# Patient Record
Sex: FEMALE | Race: WHITE | Employment: FULL TIME | URBAN - METROPOLITAN AREA
[De-identification: names, ages, dates, MRNs, and addresses within clinical notes are randomized per-mention and may not be internally consistent; named-entity substitution may affect disease eponyms.]

---

## 2017-04-14 DIAGNOSIS — F41.1 GENERALIZED ANXIETY DISORDER: ICD-10-CM

## 2017-04-17 RX ORDER — CITALOPRAM 10 MG/1
10 TABLET ORAL DAILY
Qty: 30 TABLET | Refills: 0 | Status: SHIPPED | OUTPATIENT
Start: 2017-04-17 | End: 2017-04-26 | Stop reason: SDUPTHER

## 2017-04-26 ENCOUNTER — OFFICE VISIT (OUTPATIENT)
Dept: FAMILY MEDICINE CLINIC | Age: 37
End: 2017-04-26
Payer: COMMERCIAL

## 2017-04-26 VITALS
DIASTOLIC BLOOD PRESSURE: 76 MMHG | RESPIRATION RATE: 18 BRPM | TEMPERATURE: 98.6 F | HEIGHT: 63 IN | HEART RATE: 72 BPM | SYSTOLIC BLOOD PRESSURE: 120 MMHG | WEIGHT: 156 LBS | BODY MASS INDEX: 27.64 KG/M2

## 2017-04-26 DIAGNOSIS — M79.641 RIGHT HAND PAIN: ICD-10-CM

## 2017-04-26 DIAGNOSIS — G56.01 CARPAL TUNNEL SYNDROME ON RIGHT: ICD-10-CM

## 2017-04-26 DIAGNOSIS — Z11.4 SCREENING FOR HIV (HUMAN IMMUNODEFICIENCY VIRUS): ICD-10-CM

## 2017-04-26 DIAGNOSIS — F41.1 GENERALIZED ANXIETY DISORDER: Primary | ICD-10-CM

## 2017-04-26 DIAGNOSIS — M79.644 THUMB PAIN, RIGHT: ICD-10-CM

## 2017-04-26 DIAGNOSIS — M65.4 DE QUERVAIN'S TENOSYNOVITIS, RIGHT: ICD-10-CM

## 2017-04-26 PROBLEM — E10.3299 MILD NONPROLIFERATIVE DIABETIC RETINOPATHY WITHOUT MACULAR EDEMA ASSOCIATED WITH TYPE 1 DIABETES MELLITUS (HCC): Status: ACTIVE | Noted: 2017-04-26

## 2017-04-26 PROCEDURE — 99214 OFFICE O/P EST MOD 30 MIN: CPT | Performed by: PEDIATRICS

## 2017-04-26 RX ORDER — CITALOPRAM 10 MG/1
10 TABLET ORAL DAILY
Qty: 30 TABLET | Refills: 5 | Status: SHIPPED | OUTPATIENT
Start: 2017-04-26 | End: 2017-11-17 | Stop reason: SDUPTHER

## 2017-04-26 ASSESSMENT — ENCOUNTER SYMPTOMS
STRIDOR: 0
VOMITING: 0
COUGH: 0
EYE DISCHARGE: 0
DIARRHEA: 0
SHORTNESS OF BREATH: 0
CONSTIPATION: 0
COLOR CHANGE: 0
WHEEZING: 0
EYE PAIN: 0
EYE REDNESS: 0

## 2017-05-10 LAB
ALBUMIN SERPL-MCNC: 4.2 G/DL
ALP BLD-CCNC: 77 U/L
ALT SERPL-CCNC: 21 U/L
ANION GAP SERPL CALCULATED.3IONS-SCNC: 11 MMOL/L
AST SERPL-CCNC: 22 U/L
BASOPHILS ABSOLUTE: NORMAL /ΜL
BASOPHILS RELATIVE PERCENT: NORMAL %
BILIRUB SERPL-MCNC: 0.6 MG/DL (ref 0.1–1.4)
BUN BLDV-MCNC: 18 MG/DL
CALCIUM SERPL-MCNC: 9.7 MG/DL
CHLORIDE BLD-SCNC: 102 MMOL/L
CHOLESTEROL, TOTAL: 204 MG/DL
CHOLESTEROL/HDL RATIO: 2
CO2: 27 MMOL/L
CREAT SERPL-MCNC: 0.7 MG/DL
CREATININE, URINE: 35
EOSINOPHILS ABSOLUTE: NORMAL /ΜL
EOSINOPHILS RELATIVE PERCENT: NORMAL %
GFR CALCULATED: 100.1
GLUCOSE BLD-MCNC: 145 MG/DL
HBA1C MFR BLD: 8.7 %
HCT VFR BLD CALC: 39.5 % (ref 36–46)
HDLC SERPL-MCNC: 101 MG/DL (ref 35–70)
HEMOGLOBIN: 13.7 G/DL (ref 12–16)
LDL CHOLESTEROL CALCULATED: 82.4 MG/DL (ref 0–160)
LYMPHOCYTES ABSOLUTE: NORMAL /ΜL
LYMPHOCYTES RELATIVE PERCENT: NORMAL %
MCH RBC QN AUTO: 30.4 PG
MCHC RBC AUTO-ENTMCNC: 34.7 G/DL
MCV RBC AUTO: 88 FL
MICROALBUMIN/CREAT 24H UR: 6.4 MG/G{CREAT}
MICROALBUMIN/CREAT UR-RTO: 18.3
MONOCYTES ABSOLUTE: NORMAL /ΜL
MONOCYTES RELATIVE PERCENT: NORMAL %
NEUTROPHILS ABSOLUTE: NORMAL /ΜL
NEUTROPHILS RELATIVE PERCENT: NORMAL %
PLATELET # BLD: 265 K/ΜL
PMV BLD AUTO: 8.8 FL
POTASSIUM SERPL-SCNC: 3.9 MMOL/L
RBC # BLD: 4.51 10^6/ΜL
SODIUM BLD-SCNC: 140 MMOL/L
TOTAL PROTEIN: 7.3
TRIGL SERPL-MCNC: 103 MG/DL
TSH SERPL DL<=0.05 MIU/L-ACNC: 1.16 UIU/ML
VLDLC SERPL CALC-MCNC: 20.6 MG/DL
WBC # BLD: 7.1 10^3/ML

## 2017-09-11 LAB
AVERAGE GLUCOSE: NORMAL
HBA1C MFR BLD: 8.3 %

## 2017-09-29 ENCOUNTER — TELEPHONE (OUTPATIENT)
Dept: FAMILY MEDICINE CLINIC | Age: 37
End: 2017-09-29

## 2017-10-05 RX ORDER — ETONOGESTREL/ETHINYL ESTRADIOL .12-.015MG
RING, VAGINAL VAGINAL
Qty: 1 EACH | Refills: 11 | Status: SHIPPED | OUTPATIENT
Start: 2017-10-05 | End: 2018-01-10 | Stop reason: SDUPTHER

## 2017-11-17 ENCOUNTER — OFFICE VISIT (OUTPATIENT)
Dept: FAMILY MEDICINE CLINIC | Age: 37
End: 2017-11-17
Payer: COMMERCIAL

## 2017-11-17 VITALS
WEIGHT: 159 LBS | RESPIRATION RATE: 20 BRPM | DIASTOLIC BLOOD PRESSURE: 70 MMHG | TEMPERATURE: 98.6 F | HEART RATE: 78 BPM | SYSTOLIC BLOOD PRESSURE: 120 MMHG | BODY MASS INDEX: 28.17 KG/M2

## 2017-11-17 DIAGNOSIS — G56.01 CARPAL TUNNEL SYNDROME ON RIGHT: ICD-10-CM

## 2017-11-17 DIAGNOSIS — M79.644 THUMB PAIN, RIGHT: ICD-10-CM

## 2017-11-17 DIAGNOSIS — I83.93 SPIDER VEINS OF BOTH LOWER EXTREMITIES: ICD-10-CM

## 2017-11-17 DIAGNOSIS — F41.1 GENERALIZED ANXIETY DISORDER: Primary | ICD-10-CM

## 2017-11-17 DIAGNOSIS — M65.4 DE QUERVAIN'S TENOSYNOVITIS, RIGHT: ICD-10-CM

## 2017-11-17 DIAGNOSIS — I83.93 VARICOSE VEINS OF BOTH LOWER EXTREMITIES: ICD-10-CM

## 2017-11-17 DIAGNOSIS — M79.641 RIGHT HAND PAIN: ICD-10-CM

## 2017-11-17 PROCEDURE — 99214 OFFICE O/P EST MOD 30 MIN: CPT | Performed by: PEDIATRICS

## 2017-11-17 RX ORDER — CITALOPRAM 10 MG/1
10 TABLET ORAL DAILY
Qty: 30 TABLET | Refills: 5 | Status: SHIPPED | OUTPATIENT
Start: 2017-11-17 | End: 2018-07-02 | Stop reason: SDUPTHER

## 2017-11-17 ASSESSMENT — ENCOUNTER SYMPTOMS
CONSTIPATION: 0
EYE PAIN: 0
EYE REDNESS: 0
STRIDOR: 0
COLOR CHANGE: 0
EYE DISCHARGE: 0
WHEEZING: 0
SHORTNESS OF BREATH: 0
VOMITING: 0
COUGH: 0
DIARRHEA: 0

## 2017-11-17 ASSESSMENT — PATIENT HEALTH QUESTIONNAIRE - PHQ9
SUM OF ALL RESPONSES TO PHQ QUESTIONS 1-9: 0
2. FEELING DOWN, DEPRESSED OR HOPELESS: 0
SUM OF ALL RESPONSES TO PHQ9 QUESTIONS 1 & 2: 0
1. LITTLE INTEREST OR PLEASURE IN DOING THINGS: 0

## 2017-11-17 NOTE — PROGRESS NOTES
Negative for agitation, dysphoric mood and sleep disturbance. The patient is nervous/anxious (controlled with celexa). Objective:   Physical Exam   Constitutional: She is oriented to person, place, and time. She appears well-developed and well-nourished. No distress. Eyes: Conjunctivae are normal. Pupils are equal, round, and reactive to light. Right eye exhibits no discharge. Left eye exhibits no discharge. Neck: Normal range of motion. Neck supple. No JVD present. Cardiovascular: Normal rate, regular rhythm and normal heart sounds. No murmur heard. Varicose veins on the left lower leg. Some spider veins of the right lower leg. Pulmonary/Chest: Effort normal and breath sounds normal. She has no wheezes. She has no rales. Abdominal: Soft. Bowel sounds are normal. She exhibits no mass. There is no tenderness. Musculoskeletal: She exhibits no edema or deformity. Right wrist: She exhibits tenderness (over the carpal tunnel). She exhibits normal range of motion and no deformity. Arms:  Lymphadenopathy:     She has no cervical adenopathy. Neurological: She is alert and oriented to person, place, and time. Skin: Skin is warm. No rash noted. She is not diaphoretic. Psychiatric: Her speech is normal and behavior is normal. Judgment and thought content normal. Her mood appears not anxious. Cognition and memory are normal. She does not exhibit a depressed mood. Nursing note and vitals reviewed. Assessment:      1. Generalized anxiety disorder  citalopram (CELEXA) 10 MG tablet   2. Right hand pain  External Referral To Massage Therapy   3. Carpal tunnel syndrome on right  External Referral To Massage Therapy   4. Thumb pain, right  SPLINT THUMB SPICA    External Referral To Massage Therapy   5. De Quervain's tenosynovitis, right  SPLINT THUMB SPICA    External Referral To Massage Therapy   6.  Spider veins of both lower extremities  [facility]- Becky Williamson MD   7. Varicose veins of both lower extremities  [facility]- Alfa Singh MD           Plan:      Proceed with continue same medications   Recommend daily exercise / healthy diet  Advise good sleep hygiene   Stress relieving activities encouraged  Recommend trial of carpal tunnel brace and thumb spica splint splint for right hand pain likely secondary to CTS and possible DeQuervain's tendonitis  Obtain lab as ordered when due for diabetes labs with endocrinologist  Vascular consult for evaluation of spider and varicose veins  Call with concerns     Sky received counseling on the following healthy behaviors: nutrition, exercise and medication adherence  Reviewed prior labs and health maintenance  Continue current medications, diet and exercise. Discussed use, benefit, and side effects of prescribed medications. Barriers to medication compliance addressed. Patient given educational materials - see patient instructions  Was a self-tracking handout given in paper form or via [employer]? No    Requested Prescriptions     Signed Prescriptions Disp Refills    citalopram (CELEXA) 10 MG tablet 30 tablet 5     Sig: Take 1 tablet by mouth daily       All patient questions answered. Patient voiced understanding. Quality Measures    Body mass index is 28.17 kg/m². Elevated. Weight control planned discussed Healthy diet and regular exercise. BP: 120/70 Blood pressure is normal. Treatment plan consists of No treatment change needed.     Lab Results   Component Value Date    LDLCALC 82.4 05/10/2017    (goal LDL reduction with dx if diabetes is 50% LDL reduction)      PHQ Scores 11/17/2017   PHQ2 Score 0   PHQ9 Score 0     Interpretation of Total Score Depression Severity: 1-4 = Minimal depression, 5-9 = Mild depression, 10-14 = Moderate depression, 15-19 = Moderately severe depression, 20-27 = Severe depression

## 2018-01-10 ENCOUNTER — OFFICE VISIT (OUTPATIENT)
Dept: OBGYN CLINIC | Age: 38
End: 2018-01-10
Payer: COMMERCIAL

## 2018-01-10 VITALS
SYSTOLIC BLOOD PRESSURE: 140 MMHG | WEIGHT: 161.4 LBS | BODY MASS INDEX: 27.55 KG/M2 | DIASTOLIC BLOOD PRESSURE: 74 MMHG | HEIGHT: 64 IN

## 2018-01-10 DIAGNOSIS — Z30.44 ENCOUNTER FOR SURVEILLANCE OF VAGINAL RING HORMONAL CONTRACEPTIVE DEVICE: ICD-10-CM

## 2018-01-10 DIAGNOSIS — Z01.419 WELL FEMALE EXAM WITH ROUTINE GYNECOLOGICAL EXAM: Primary | ICD-10-CM

## 2018-01-10 PROCEDURE — 99395 PREV VISIT EST AGE 18-39: CPT | Performed by: OBSTETRICS & GYNECOLOGY

## 2018-01-10 RX ORDER — ETONOGESTREL AND ETHINYL ESTRADIOL 11.7; 2.7 MG/1; MG/1
1 INSERT, EXTENDED RELEASE VAGINAL SEE ADMIN INSTRUCTIONS
Qty: 3 EACH | Refills: 3 | Status: SHIPPED | OUTPATIENT
Start: 2018-01-10 | End: 2019-01-01 | Stop reason: SDUPTHER

## 2018-01-10 ASSESSMENT — ENCOUNTER SYMPTOMS
BLOOD IN STOOL: 0
WHEEZING: 0
DIARRHEA: 0
APNEA: 0
COUGH: 0
ABDOMINAL DISTENTION: 0
NAUSEA: 0
SORE THROAT: 0
ANAL BLEEDING: 0
SHORTNESS OF BREATH: 0
VOMITING: 0
ABDOMINAL PAIN: 0
CONSTIPATION: 0
RECTAL PAIN: 0
BACK PAIN: 0
TROUBLE SWALLOWING: 0
CHEST TIGHTNESS: 0

## 2018-01-10 NOTE — PROGRESS NOTES
decreased concentration, dysphoric mood, sleep disturbance and suicidal ideas. The patient is not nervous/anxious. Physical Exam   Constitutional: She is oriented to person, place, and time. She appears well-developed and well-nourished. No distress. HENT:   Head: Normocephalic and atraumatic. Mouth/Throat: Oropharynx is clear and moist.   Eyes: Pupils are equal, round, and reactive to light. Neck: Normal range of motion. Neck supple. No thyromegaly present. Cardiovascular: Normal rate, regular rhythm and normal heart sounds. No murmur heard. Pulmonary/Chest: Effort normal and breath sounds normal. She has no wheezes. She has no rales. She exhibits no tenderness. Abdominal: Soft. Bowel sounds are normal. She exhibits no distension and no mass. There is no tenderness. There is no rebound and no guarding. Genitourinary: Vagina normal and uterus normal. Rectal exam shows no external hemorrhoid and anal tone normal. No breast swelling, tenderness, discharge or bleeding. There is no lesion on the right labia. There is no lesion on the left labia. Uterus is not deviated, not enlarged, not fixed and not tender. Cervix exhibits no motion tenderness, no discharge and no friability. Right adnexum displays no mass, no tenderness and no fullness. Left adnexum displays no mass, no tenderness and no fullness. No erythema in the vagina. No vaginal discharge found. Musculoskeletal: Normal range of motion. She exhibits no edema or tenderness. Lymphadenopathy:     She has no cervical adenopathy. Right: No inguinal adenopathy present. Left: No inguinal adenopathy present. Neurological: She is alert and oriented to person, place, and time. She has normal reflexes. No cranial nerve deficit. Coordination normal.   Skin: Skin is warm and dry. No rash noted. She is not diaphoretic. No erythema. No pallor. Psychiatric: She has a normal mood and affect.  Her behavior is normal. Judgment and

## 2018-01-22 DIAGNOSIS — Z01.419 WELL FEMALE EXAM WITH ROUTINE GYNECOLOGICAL EXAM: ICD-10-CM

## 2018-01-22 LAB — PAP SMEAR: NORMAL

## 2018-01-22 NOTE — LETTER
[address] [place] [address] [address]  [place] [facility]  Phone: [phone]  Fax: [phone]    Dear Haleigh Gaona,    The results of the following test(s) you had done  are as follows and requires follow up as indicated.         Within Normal Limits   Further Action     Annual Follow Up      As Directed         Pap Smear:                      Mammogram:                          Endometrial Biopsy:                                                              Cervical Biopsy:                                                                     Dexascan:                                                                                                Other:            Instructions for further action:         Even if findings are within normal limits now, it is important to continue annual visits with your doctor for regular screenings and exam.    Thank You,     Dr. Jose Bradshaw

## 2018-06-14 ENCOUNTER — OFFICE VISIT (OUTPATIENT)
Dept: FAMILY MEDICINE CLINIC | Age: 38
End: 2018-06-14
Payer: COMMERCIAL

## 2018-06-14 VITALS
DIASTOLIC BLOOD PRESSURE: 82 MMHG | SYSTOLIC BLOOD PRESSURE: 130 MMHG | TEMPERATURE: 99.1 F | RESPIRATION RATE: 18 BRPM | BODY MASS INDEX: 28.48 KG/M2 | WEIGHT: 164.6 LBS | HEART RATE: 90 BPM

## 2018-06-14 DIAGNOSIS — I83.93 SPIDER VEINS OF BOTH LOWER EXTREMITIES: ICD-10-CM

## 2018-06-14 DIAGNOSIS — F41.1 GENERALIZED ANXIETY DISORDER: Primary | ICD-10-CM

## 2018-06-14 DIAGNOSIS — I83.93 VARICOSE VEINS OF BOTH LOWER EXTREMITIES: ICD-10-CM

## 2018-06-14 PROCEDURE — 99214 OFFICE O/P EST MOD 30 MIN: CPT | Performed by: PEDIATRICS

## 2018-06-14 RX ORDER — ALPRAZOLAM 0.5 MG/1
0.25 TABLET ORAL DAILY PRN
Qty: 15 TABLET | Refills: 0 | Status: SHIPPED | OUTPATIENT
Start: 2018-06-14 | End: 2018-12-14 | Stop reason: SDUPTHER

## 2018-06-14 ASSESSMENT — ENCOUNTER SYMPTOMS
VOMITING: 0
STRIDOR: 0
EYE DISCHARGE: 0
EYE PAIN: 0
COUGH: 0
WHEEZING: 0
COLOR CHANGE: 0
EYE REDNESS: 0
SHORTNESS OF BREATH: 0
DIARRHEA: 0
CONSTIPATION: 0

## 2018-06-25 LAB
ALBUMIN SERPL-MCNC: 4.2 G/DL
ALP BLD-CCNC: 88 U/L
ALT SERPL-CCNC: 28 U/L
ANION GAP SERPL CALCULATED.3IONS-SCNC: 9 MMOL/L
AST SERPL-CCNC: 22 U/L
BASOPHILS ABSOLUTE: NORMAL /ΜL
BASOPHILS RELATIVE PERCENT: NORMAL %
BILIRUB SERPL-MCNC: 0.5 MG/DL (ref 0.1–1.4)
BUN BLDV-MCNC: 23 MG/DL
CALCIUM SERPL-MCNC: 10.1 MG/DL
CHLORIDE BLD-SCNC: 99 MMOL/L
CO2: 30 MMOL/L
CREAT SERPL-MCNC: 0.8 MG/DL
CREATININE URINE: 96.7 MG/DL
EOSINOPHILS ABSOLUTE: NORMAL /ΜL
EOSINOPHILS RELATIVE PERCENT: NORMAL %
GFR CALCULATED: 111.7
GLUCOSE BLD-MCNC: 164 MG/DL
HCT VFR BLD CALC: 41.2 % (ref 36–46)
HEMOGLOBIN: 14.2 G/DL (ref 12–16)
LYMPHOCYTES ABSOLUTE: NORMAL /ΜL
LYMPHOCYTES RELATIVE PERCENT: NORMAL %
MCH RBC QN AUTO: 34.5 PG
MCHC RBC AUTO-ENTMCNC: 89 G/DL
MCV RBC AUTO: 30.8 FL
MICROALBUMIN/CREAT 24H UR: 14.3 MG/G{CREAT}
MONOCYTES ABSOLUTE: NORMAL /ΜL
MONOCYTES RELATIVE PERCENT: NORMAL %
NEUTROPHILS ABSOLUTE: NORMAL /ΜL
NEUTROPHILS RELATIVE PERCENT: NORMAL %
PLATELET # BLD: 257 K/ΜL
PMV BLD AUTO: 9.5 FL
POTASSIUM SERPL-SCNC: 3.9 MMOL/L
RBC # BLD: 4.62 10^6/ΜL
SODIUM BLD-SCNC: 138 MMOL/L
TOTAL PROTEIN: 7.4
TSH SERPL DL<=0.05 MIU/L-ACNC: 1.31 UIU/ML
WBC # BLD: 7.8 10^3/ML

## 2018-07-02 DIAGNOSIS — F41.1 GENERALIZED ANXIETY DISORDER: ICD-10-CM

## 2018-07-03 RX ORDER — CITALOPRAM 10 MG/1
10 TABLET ORAL DAILY
Qty: 30 TABLET | Refills: 5 | Status: SHIPPED | OUTPATIENT
Start: 2018-07-03 | End: 2018-12-14 | Stop reason: SDUPTHER

## 2018-12-14 ENCOUNTER — OFFICE VISIT (OUTPATIENT)
Dept: FAMILY MEDICINE CLINIC | Age: 38
End: 2018-12-14
Payer: COMMERCIAL

## 2018-12-14 VITALS
SYSTOLIC BLOOD PRESSURE: 120 MMHG | DIASTOLIC BLOOD PRESSURE: 70 MMHG | RESPIRATION RATE: 18 BRPM | BODY MASS INDEX: 28.37 KG/M2 | WEIGHT: 164 LBS | TEMPERATURE: 98.8 F | HEART RATE: 72 BPM

## 2018-12-14 DIAGNOSIS — I83.813 VARICOSE VEINS OF BOTH LOWER EXTREMITIES WITH PAIN: ICD-10-CM

## 2018-12-14 DIAGNOSIS — F41.1 GENERALIZED ANXIETY DISORDER: Primary | ICD-10-CM

## 2018-12-14 DIAGNOSIS — I83.93 SPIDER VEINS OF BOTH LOWER EXTREMITIES: ICD-10-CM

## 2018-12-14 PROCEDURE — 99214 OFFICE O/P EST MOD 30 MIN: CPT | Performed by: PEDIATRICS

## 2018-12-14 RX ORDER — ALPRAZOLAM 0.5 MG/1
0.25 TABLET ORAL DAILY PRN
Qty: 15 TABLET | Refills: 0 | Status: SHIPPED | OUTPATIENT
Start: 2018-12-14 | End: 2019-07-31 | Stop reason: SDUPTHER

## 2018-12-14 RX ORDER — CITALOPRAM 10 MG/1
10 TABLET ORAL DAILY
Qty: 30 TABLET | Refills: 5 | Status: SHIPPED | OUTPATIENT
Start: 2018-12-14 | End: 2019-07-05 | Stop reason: SDUPTHER

## 2018-12-14 ASSESSMENT — PATIENT HEALTH QUESTIONNAIRE - PHQ9
2. FEELING DOWN, DEPRESSED OR HOPELESS: 0
1. LITTLE INTEREST OR PLEASURE IN DOING THINGS: 0
SUM OF ALL RESPONSES TO PHQ QUESTIONS 1-9: 0
SUM OF ALL RESPONSES TO PHQ QUESTIONS 1-9: 0
SUM OF ALL RESPONSES TO PHQ9 QUESTIONS 1 & 2: 0

## 2018-12-14 ASSESSMENT — ENCOUNTER SYMPTOMS
STRIDOR: 0
VOMITING: 0
SHORTNESS OF BREATH: 0
CONSTIPATION: 0
DIARRHEA: 0
EYE REDNESS: 0
COLOR CHANGE: 0
EYE PAIN: 0
COUGH: 0
EYE DISCHARGE: 0
WHEEZING: 0

## 2018-12-14 NOTE — PROGRESS NOTES
FLEXTOUCH) 100 UNIT/ML SOPN Inject 25 Units into the skin      citalopram (CELEXA) 10 MG tablet Take 1 tablet by mouth daily 30 tablet 5    ALPRAZolam (XANAX) 0.5 MG tablet Take 0.5 tablets by mouth daily as needed for Sleep or Anxiety for up to 30 days. . 15 tablet 0    etonogestrel-ethinyl estradiol (NUVARING) 0.12-0.015 MG/24HR vaginal ring Place 1 each vaginally See Admin Instructions 3 each 3    Insulin Aspart Prot & Aspart (NOVOLOG MIX 70/30 PENFILL SC) Inject into the skin Sliding scale      Multiple Vitamins-Minerals (THERAPEUTIC MULTIVITAMIN-MINERALS) tablet Take 1 tablet by mouth daily      ONE TOUCH ULTRA TEST strip       B-D INS SYRINGE 0.5CC/30GX1/2\" 30G X 1/2\" 0.5 ML MISC        No current facility-administered medications for this visit. Anxiety: Patient complains of evaluation of anxiety disorder. She has the following anxiety symptoms: none. Onset of symptoms was approximately several years ago, stable since that time. She denies current suicidal and homicidal ideation. Family history significant for no psychiatric illness. Possible organic causes contributing are: none. Risk factors: none Previous treatment includes Celexa and none. She complains of the following side effects from the treatment: none. HPI    Patient presents today for routine follow-up of generalized anxiety disorder. She states that overall her anxiety is very well controlled with Celexa 10 mg once daily. She feels less stress, less irritable and does not feel depressed or anxious. She denies any side effects from her medication. She does have a prescription for Xanax for doctors appointments because she does become very nervous when going to the doctor. She does have diabetes type 1 which is managed by endocrinology. She also has painful spider veins and varicose veins of the lower legs. She was referred to vascular surgery after her last visit however she has not made this appointment.   She will do this

## 2019-02-01 RX ORDER — ETONOGESTREL AND ETHINYL ESTRADIOL 11.7; 2.7 MG/1; MG/1
1 INSERT, EXTENDED RELEASE VAGINAL SEE ADMIN INSTRUCTIONS
Qty: 3 EACH | Refills: 0 | Status: SHIPPED | OUTPATIENT
Start: 2019-02-01 | End: 2019-02-13 | Stop reason: SDUPTHER

## 2019-02-13 ENCOUNTER — OFFICE VISIT (OUTPATIENT)
Dept: OBGYN CLINIC | Age: 39
End: 2019-02-13
Payer: COMMERCIAL

## 2019-02-13 VITALS
WEIGHT: 164 LBS | BODY MASS INDEX: 28 KG/M2 | HEIGHT: 64 IN | SYSTOLIC BLOOD PRESSURE: 138 MMHG | DIASTOLIC BLOOD PRESSURE: 88 MMHG

## 2019-02-13 DIAGNOSIS — Z12.39 SCREENING FOR BREAST CANCER: ICD-10-CM

## 2019-02-13 DIAGNOSIS — Z01.419 VISIT FOR GYNECOLOGIC EXAMINATION: Primary | ICD-10-CM

## 2019-02-13 PROCEDURE — 99395 PREV VISIT EST AGE 18-39: CPT | Performed by: OBSTETRICS & GYNECOLOGY

## 2019-02-13 RX ORDER — ETONOGESTREL AND ETHINYL ESTRADIOL 11.7; 2.7 MG/1; MG/1
1 INSERT, EXTENDED RELEASE VAGINAL SEE ADMIN INSTRUCTIONS
Qty: 3 EACH | Refills: 0 | Status: SHIPPED | OUTPATIENT
Start: 2019-02-13 | End: 2020-02-18

## 2019-02-13 ASSESSMENT — ENCOUNTER SYMPTOMS
VOMITING: 0
NAUSEA: 0
RECTAL PAIN: 0
CONSTIPATION: 0
ABDOMINAL DISTENTION: 0
BACK PAIN: 0
ABDOMINAL PAIN: 0
BLOOD IN STOOL: 0
ANAL BLEEDING: 0
WHEEZING: 0
SORE THROAT: 0
COUGH: 0
SHORTNESS OF BREATH: 0
DIARRHEA: 0
APNEA: 0
TROUBLE SWALLOWING: 0
CHEST TIGHTNESS: 0

## 2019-02-20 DIAGNOSIS — Z01.419 VISIT FOR GYNECOLOGIC EXAMINATION: ICD-10-CM

## 2019-02-20 LAB — PAP SMEAR: NORMAL

## 2019-07-05 DIAGNOSIS — F41.1 GENERALIZED ANXIETY DISORDER: ICD-10-CM

## 2019-07-09 RX ORDER — CITALOPRAM 10 MG/1
TABLET ORAL
Qty: 30 TABLET | Refills: 5 | Status: SHIPPED | OUTPATIENT
Start: 2019-07-09 | End: 2020-01-13

## 2019-07-31 ENCOUNTER — OFFICE VISIT (OUTPATIENT)
Dept: FAMILY MEDICINE CLINIC | Age: 39
End: 2019-07-31
Payer: COMMERCIAL

## 2019-07-31 VITALS
RESPIRATION RATE: 20 BRPM | BODY MASS INDEX: 28.2 KG/M2 | WEIGHT: 163 LBS | DIASTOLIC BLOOD PRESSURE: 80 MMHG | TEMPERATURE: 98.3 F | HEART RATE: 80 BPM | SYSTOLIC BLOOD PRESSURE: 122 MMHG

## 2019-07-31 DIAGNOSIS — F41.1 GENERALIZED ANXIETY DISORDER: Primary | ICD-10-CM

## 2019-07-31 DIAGNOSIS — I83.813 VARICOSE VEINS OF BOTH LOWER EXTREMITIES WITH PAIN: ICD-10-CM

## 2019-07-31 DIAGNOSIS — I83.93 SPIDER VEINS OF BOTH LOWER EXTREMITIES: ICD-10-CM

## 2019-07-31 PROCEDURE — 99214 OFFICE O/P EST MOD 30 MIN: CPT | Performed by: PEDIATRICS

## 2019-07-31 RX ORDER — ALPRAZOLAM 0.5 MG/1
0.25 TABLET ORAL DAILY PRN
Qty: 15 TABLET | Refills: 0 | Status: SHIPPED | OUTPATIENT
Start: 2019-07-31 | End: 2021-02-24 | Stop reason: SDUPTHER

## 2019-07-31 ASSESSMENT — ENCOUNTER SYMPTOMS
EYE DISCHARGE: 0
COLOR CHANGE: 0
STRIDOR: 0
EYE PAIN: 0
VOMITING: 0
COUGH: 0
WHEEZING: 0
CONSTIPATION: 0
EYE REDNESS: 0
SHORTNESS OF BREATH: 0
DIARRHEA: 0

## 2019-07-31 ASSESSMENT — PATIENT HEALTH QUESTIONNAIRE - PHQ9
SUM OF ALL RESPONSES TO PHQ9 QUESTIONS 1 & 2: 0
SUM OF ALL RESPONSES TO PHQ QUESTIONS 1-9: 0
SUM OF ALL RESPONSES TO PHQ QUESTIONS 1-9: 0
1. LITTLE INTEREST OR PLEASURE IN DOING THINGS: 0
2. FEELING DOWN, DEPRESSED OR HOPELESS: 0

## 2019-07-31 NOTE — PROGRESS NOTES
Prescriptions Disp Refills    ALPRAZolam (XANAX) 0.5 MG tablet 15 tablet 0     Sig: Take 0.5 tablets by mouth daily as needed for Sleep or Anxiety for up to 30 days. All patient questions answered. Patient voiced understanding. Quality Measures    Body mass index is 28.2 kg/m². Elevated. Weight control planned discussed Healthy diet and regular exercise. BP: 122/80 Blood pressure is normal. Treatment plan consists of No treatment change needed.     Lab Results   Component Value Date    LDLCALC 82.4 05/10/2017    (goal LDL reduction with dx if diabetes is 50% LDL reduction)      PHQ Scores 7/31/2019 12/14/2018 11/17/2017   PHQ2 Score 0 0 0   PHQ9 Score 0 0 0     Interpretation of Total Score Depression Severity: 1-4 = Minimal depression, 5-9 = Mild depression, 10-14 = Moderate depression, 15-19 = Moderately severe depression, 20-27 = Severe depression        Electronically signed by Gayle Casarez MD on 7/31/2019 at 12:53 PM

## 2019-08-26 LAB
ALBUMIN SERPL-MCNC: 4.1 G/DL
ALP BLD-CCNC: 85 U/L
ALT SERPL-CCNC: 16 U/L
ANION GAP SERPL CALCULATED.3IONS-SCNC: 10 MMOL/L
AST SERPL-CCNC: 22 U/L
AVERAGE GLUCOSE: 118
BASOPHILS ABSOLUTE: NORMAL /ΜL
BASOPHILS RELATIVE PERCENT: NORMAL %
BILIRUB SERPL-MCNC: 0.5 MG/DL (ref 0.1–1.4)
BUN BLDV-MCNC: 18 MG/DL
CALCIUM SERPL-MCNC: 9.3 MG/DL
CHLORIDE BLD-SCNC: 101 MMOL/L
CHOLESTEROL, TOTAL: 270 MG/DL
CHOLESTEROL/HDL RATIO: 2.7
CO2: 28 MMOL/L
CREAT SERPL-MCNC: 0.8 MG/DL
CREATININE URINE: NORMAL MG/DL
EOSINOPHILS ABSOLUTE: NORMAL /ΜL
EOSINOPHILS RELATIVE PERCENT: NORMAL %
GFR CALCULATED: 112.5
GLUCOSE BLD-MCNC: 133 MG/DL
HBA1C MFR BLD: 8 %
HCT VFR BLD CALC: 39.4 % (ref 36–46)
HDLC SERPL-MCNC: 78 MG/DL (ref 35–70)
HEMOGLOBIN: 13.4 G/DL (ref 12–16)
LDL CHOLESTEROL CALCULATED: 103.2 MG/DL (ref 0–160)
LYMPHOCYTES ABSOLUTE: NORMAL /ΜL
LYMPHOCYTES RELATIVE PERCENT: NORMAL %
MCH RBC QN AUTO: 33.9 PG
MCHC RBC AUTO-ENTMCNC: 91 G/DL
MCV RBC AUTO: 30.7 FL
MICROALBUMIN/CREAT 24H UR: 18.7 MG/G{CREAT}
MONOCYTES ABSOLUTE: NORMAL /ΜL
MONOCYTES RELATIVE PERCENT: NORMAL %
NEUTROPHILS ABSOLUTE: NORMAL /ΜL
NEUTROPHILS RELATIVE PERCENT: NORMAL %
PLATELET # BLD: 247 K/ΜL
PMV BLD AUTO: 9.7 FL
POTASSIUM SERPL-SCNC: 4 MMOL/L
RBC # BLD: 4.36 10^6/ΜL
SODIUM BLD-SCNC: 139 MMOL/L
TOTAL PROTEIN: 7.2
TRIGL SERPL-MCNC: 129 MG/DL
VLDLC SERPL CALC-MCNC: 25.8 MG/DL
WBC # BLD: 5.9 10^3/ML

## 2019-09-10 RX ORDER — ETONOGESTREL/ETHINYL ESTRADIOL .12-.015MG
RING, VAGINAL VAGINAL
Qty: 3 EACH | Refills: 4 | Status: SHIPPED | OUTPATIENT
Start: 2019-09-10 | End: 2020-02-18 | Stop reason: SDUPTHER

## 2020-01-03 LAB
AVERAGE GLUCOSE: 145
HBA1C MFR BLD: 9.1 %

## 2020-01-13 RX ORDER — CITALOPRAM 10 MG/1
10 TABLET ORAL DAILY
Qty: 30 TABLET | Refills: 5 | Status: SHIPPED | OUTPATIENT
Start: 2020-01-13 | End: 2020-07-13

## 2020-02-07 ENCOUNTER — OFFICE VISIT (OUTPATIENT)
Dept: FAMILY MEDICINE CLINIC | Age: 40
End: 2020-02-07
Payer: COMMERCIAL

## 2020-02-07 VITALS
TEMPERATURE: 99 F | SYSTOLIC BLOOD PRESSURE: 124 MMHG | HEART RATE: 87 BPM | DIASTOLIC BLOOD PRESSURE: 76 MMHG | BODY MASS INDEX: 28.34 KG/M2 | WEIGHT: 163.8 LBS

## 2020-02-07 PROCEDURE — 99214 OFFICE O/P EST MOD 30 MIN: CPT | Performed by: PEDIATRICS

## 2020-02-07 ASSESSMENT — ENCOUNTER SYMPTOMS
EYE DISCHARGE: 0
WHEEZING: 0
COUGH: 0
DIARRHEA: 0
EYE PAIN: 0
COLOR CHANGE: 0
EYE REDNESS: 0
VOMITING: 0
SHORTNESS OF BREATH: 0
STRIDOR: 0
CONSTIPATION: 0

## 2020-02-07 ASSESSMENT — PATIENT HEALTH QUESTIONNAIRE - PHQ9
SUM OF ALL RESPONSES TO PHQ9 QUESTIONS 1 & 2: 0
SUM OF ALL RESPONSES TO PHQ QUESTIONS 1-9: 0
2. FEELING DOWN, DEPRESSED OR HOPELESS: 0
SUM OF ALL RESPONSES TO PHQ QUESTIONS 1-9: 0
1. LITTLE INTEREST OR PLEASURE IN DOING THINGS: 0

## 2020-02-18 ENCOUNTER — OFFICE VISIT (OUTPATIENT)
Dept: OBGYN CLINIC | Age: 40
End: 2020-02-18
Payer: COMMERCIAL

## 2020-02-18 VITALS
BODY MASS INDEX: 28.58 KG/M2 | SYSTOLIC BLOOD PRESSURE: 134 MMHG | WEIGHT: 167.4 LBS | DIASTOLIC BLOOD PRESSURE: 84 MMHG | HEIGHT: 64 IN

## 2020-02-18 PROCEDURE — 99396 PREV VISIT EST AGE 40-64: CPT | Performed by: OBSTETRICS & GYNECOLOGY

## 2020-02-18 RX ORDER — ETONOGESTREL AND ETHINYL ESTRADIOL 11.7; 2.7 MG/1; MG/1
1 INSERT, EXTENDED RELEASE VAGINAL SEE ADMIN INSTRUCTIONS
Qty: 3 EACH | Refills: 4 | Status: SHIPPED | OUTPATIENT
Start: 2020-02-18 | End: 2021-02-22 | Stop reason: SDUPTHER

## 2020-02-18 ASSESSMENT — ENCOUNTER SYMPTOMS
VOMITING: 0
ANAL BLEEDING: 0
NAUSEA: 0
SHORTNESS OF BREATH: 0
WHEEZING: 0
TROUBLE SWALLOWING: 0
APNEA: 0
DIARRHEA: 0
CONSTIPATION: 0
ABDOMINAL DISTENTION: 0
BLOOD IN STOOL: 0
CHEST TIGHTNESS: 0
RECTAL PAIN: 0
ABDOMINAL PAIN: 0
BACK PAIN: 0
COUGH: 0
SORE THROAT: 0

## 2020-02-18 NOTE — PROGRESS NOTES
Cassidy Ly is a 35 y.o. [id number], here for her annual exam.  The patient was seen and examined. The patients past medical, surgical, social and family history were reviewed. Current medications and allergies were reviewed, and documented in the chart. HPI  No new medical problems. No gyn complaints.   Due for first mammogram.  Need refill of Nuvaring  Menstrual history: periods are regular with scant flow and no pain  Birth control: nuvaring    Wt Readings from Last 3 Encounters:   20 167 lb 6.4 oz (75.9 kg)   20 163 lb 12.8 oz (74.3 kg)   19 163 lb (73.9 kg)     Past Medical History:   Diagnosis Date    Diabetes type 1, controlled ([address]     IDDM    Fracture of elbow     left as a child    Mild nonproliferative diabetic retinopathy without macular edema associated with type 1 diabetes mellitus ([address] 2017                                                                   Past Surgical History:   Procedure Laterality Date     SECTION      ELBOW SURGERY      pins left elbow as a child , after fracture     Family History   Problem Relation Age of Onset    Cancer Mother         melanoma, HTN, Hx blood clots arm    Hypertension Mother     Diabetes Sister     Diabetes Brother         IDDM    Stroke Maternal Grandfather     Other Paternal Grandmother         back problems    Cancer Paternal Grandfather         heart disease      Social History     Tobacco Use   Smoking Status Never Smoker   Smokeless Tobacco Never Used     Social History     Substance and Sexual Activity   Alcohol Use No     Lab Results   Component Value Date    WBC 5.9 2019    RBC 4.36 2019    HGB 13.4 2019    HCT 39.4 2019    MCV 30.7 2019    MCH 33.9 2019    MCHC 91 2019     2019    MPV 9.7 2019       MEDICATIONS:  Current Outpatient Medications   Medication Sig Dispense Refill    etonogestrel-ethinyl estradiol (NUVARING) 0.12-0.015 MG/24HR vaginal ring Place 1 each vaginally See Admin Instructions 3 each 4    citalopram (CELEXA) 10 MG tablet Take 1 tablet by mouth daily 30 tablet 5    Insulin Degludec (TRESIBA FLEXTOUCH) 100 UNIT/ML SOPN Inject 25 Units into the skin      Insulin Aspart Prot & Aspart (NOVOLOG MIX 70/30 PENFILL SC) Inject into the skin Sliding scale      Multiple Vitamins-Minerals (THERAPEUTIC MULTIVITAMIN-MINERALS) tablet Take 1 tablet by mouth daily      B-D INS SYRINGE 0.5CC/30GX1/2\" 30G X 1/2\" 0.5 ML MISC       ONE TOUCH ULTRA TEST strip        No current facility-administered medications for this visit. ALLERGIES:  Patient has no known allergies. Review of Systems   Constitutional: Negative for activity change, appetite change, fatigue, fever and unexpected weight change. HENT: Negative for congestion, hearing loss, mouth sores, nosebleeds, sore throat and trouble swallowing. Eyes: Negative for visual disturbance. Respiratory: Negative for apnea, cough, chest tightness, shortness of breath and wheezing. Cardiovascular: Negative for chest pain, palpitations and leg swelling. Gastrointestinal: Negative for abdominal distention, abdominal pain, anal bleeding, blood in stool, constipation, diarrhea, nausea, rectal pain and vomiting. Endocrine: Negative for cold intolerance, heat intolerance, polydipsia, polyphagia and polyuria. Genitourinary: Negative for difficulty urinating, dyspareunia, dysuria, flank pain, frequency, genital sores, hematuria, menstrual problem, pelvic pain, urgency, vaginal bleeding, vaginal discharge and vaginal pain. Musculoskeletal: Negative for arthralgias, back pain, myalgias and neck pain. Skin: Negative for pallor, rash and wound. Allergic/Immunologic: Negative for environmental allergies, food allergies and immunocompromised state. Neurological: Negative for dizziness, seizures, syncope, speech difficulty, weakness, light-headedness, numbness and headaches. Hematological: Negative for adenopathy. Does not bruise/bleed easily. Psychiatric/Behavioral: Negative for agitation, decreased concentration, dysphoric mood, sleep disturbance and suicidal ideas. The patient is not nervous/anxious. Physical Exam  Constitutional:       General: She is not in acute distress. Appearance: She is well-developed. She is not diaphoretic. HENT:      Head: Normocephalic and atraumatic. Eyes:      Pupils: Pupils are equal, round, and reactive to light. Neck:      Musculoskeletal: Normal range of motion and neck supple. Thyroid: No thyromegaly. Cardiovascular:      Rate and Rhythm: Normal rate and regular rhythm. Heart sounds: Normal heart sounds. No murmur. Pulmonary:      Effort: Pulmonary effort is normal.      Breath sounds: Normal breath sounds. No wheezing or rales. Chest:      Chest wall: No tenderness. Abdominal:      General: Bowel sounds are normal. There is no distension. Palpations: Abdomen is soft. There is no mass. Tenderness: There is no abdominal tenderness. There is no guarding or rebound. Genitourinary:     Labia:         Right: No lesion. Left: No lesion. Vagina: Normal. No vaginal discharge or erythema. Cervix: No cervical motion tenderness, discharge or friability. Uterus: Not deviated, not enlarged, not fixed and not tender. Adnexa:         Right: No mass, tenderness or fullness. Left: No mass, tenderness or fullness. Rectum: No external hemorrhoid. Normal anal tone. Musculoskeletal: Normal range of motion. General: No tenderness. Lymphadenopathy:      Cervical: No cervical adenopathy. Skin:     General: Skin is warm and dry. Coloration: Skin is not pale. Findings: No erythema or rash. Neurological:      Mental Status: She is alert and oriented to person, place, and time. Cranial Nerves: No cranial nerve deficit.       Coordination: Coordination normal.      Deep Tendon Reflexes: Reflexes are normal and symmetric. Psychiatric:         Behavior: Behavior normal.         Thought Content: Thought content normal.         Judgment: Judgment normal.           Assessment:    ICD-10-CM    1. Women's annual routine gynecological examination Z01.419 PAP Smear   2. Encounter for screening mammogram for breast cancer Z12.31 PANDA DIGITAL SCREEN W CAD BILATERAL           Plan:  Pap done; mammogram ordered. Nuvaring refilled. RTO 1 year or prn.

## 2020-07-13 RX ORDER — CITALOPRAM 10 MG/1
10 TABLET ORAL DAILY
Qty: 30 TABLET | Refills: 5 | Status: SHIPPED | OUTPATIENT
Start: 2020-07-13 | End: 2021-01-22

## 2020-08-12 ENCOUNTER — OFFICE VISIT (OUTPATIENT)
Dept: FAMILY MEDICINE CLINIC | Age: 40
End: 2020-08-12
Payer: COMMERCIAL

## 2020-08-12 VITALS
HEART RATE: 86 BPM | RESPIRATION RATE: 14 BRPM | WEIGHT: 157.8 LBS | BODY MASS INDEX: 27.3 KG/M2 | TEMPERATURE: 97.8 F | DIASTOLIC BLOOD PRESSURE: 72 MMHG | SYSTOLIC BLOOD PRESSURE: 118 MMHG

## 2020-08-12 PROCEDURE — 99214 OFFICE O/P EST MOD 30 MIN: CPT | Performed by: PEDIATRICS

## 2020-08-12 ASSESSMENT — ENCOUNTER SYMPTOMS
SHORTNESS OF BREATH: 1
RHINORRHEA: 0
SORE THROAT: 0
EYE PAIN: 0
STRIDOR: 0
DIARRHEA: 0
COLOR CHANGE: 0
WHEEZING: 0
PHOTOPHOBIA: 0
COUGH: 1
EYE REDNESS: 0
CHEST TIGHTNESS: 1
EYE DISCHARGE: 0
CONSTIPATION: 0
VOMITING: 0

## 2020-08-12 NOTE — PROGRESS NOTES
Moderate depression, 15-19 = Moderately severe depression, 20-27 = Severe depression    Current Outpatient Medications   Medication Sig Dispense Refill    citalopram (CELEXA) 10 MG tablet Take 1 tablet by mouth daily 30 tablet 5    etonogestrel-ethinyl estradiol (NUVARING) 0.12-0.015 MG/24HR vaginal ring Place 1 each vaginally See Admin Instructions 3 each 4    Insulin Degludec (TRESIBA FLEXTOUCH) 100 UNIT/ML SOPN Inject 25 Units into the skin      Insulin Aspart Prot & Aspart (NOVOLOG MIX 70/30 PENFILL SC) Inject into the skin Sliding scale      Multiple Vitamins-Minerals (THERAPEUTIC MULTIVITAMIN-MINERALS) tablet Take 1 tablet by mouth daily      ONE TOUCH ULTRA TEST strip       B-D INS SYRINGE 0.5CC/30GX1/2\" 30G X 1/2\" 0.5 ML MISC        No current facility-administered medications for this visit. HPI    Patient presents today for routine follow-up of generalized anxiety disorder. She states that overall her anxiety has been doing well. She does continue on Celexa 10 mg once daily. She denies any side effects from her medication. She does have a prescription for Xanax that she uses prior to doctor's appointments as this is when she becomes the most nervous/anxious. She does have a history of painful spider veins and varicose veins of the lower legs. She was referred to vascular surgery, Dr. Shandra Michael and did have an ultrasound done that did verify her spider and varicose veins as well as some venous insufficiency. It was recommended that she have a procedure done to correct these varicose veins however Dr. Shandra Michael is not covered under her insurance. She was given a referral to a different vascular surgeon and will find out who this is and let me know. She does tell me that she has been coughing frequently especially when she is exercising. She states that she will cough for about an hour or 2 after exercise.   She has been running 3-4 times per week and also reports that she does feel somewhat short of breath and cannot catch her breath when she is running. She does wonder if she has asthma. She has no other concerns at this time. cmw        Review of Systems   Constitutional: Negative for appetite change, fatigue and fever. HENT: Negative for congestion, postnasal drip, rhinorrhea and sore throat. Eyes: Negative for photophobia, pain, discharge, redness and visual disturbance. Respiratory: Positive for cough, chest tightness and shortness of breath. Negative for wheezing and stridor. Exercise induced cough and shortness of breath    Cardiovascular: Negative for chest pain, palpitations and leg swelling. Painful varicose veins (left greater than right) and some spider veins on the right lower leg - tender at times. Gastrointestinal: Negative for constipation, diarrhea and vomiting. Endocrine: Negative for polydipsia, polyphagia and polyuria. Genitourinary: Negative for decreased urine volume, dysuria, frequency, hematuria, pelvic pain and urgency. Musculoskeletal: Negative for arthralgias and joint swelling. Skin: Negative for color change and rash. Allergic/Immunologic: Negative for immunocompromised state. Neurological: Negative for dizziness, weakness, light-headedness, numbness and headaches. Hematological: Negative for adenopathy. Does not bruise/bleed easily. Psychiatric/Behavioral: Negative for agitation, dysphoric mood and sleep disturbance. The patient is nervous/anxious (controlled with celexa). Objective:     /72 (Site: Right Upper Arm, Position: Sitting, Cuff Size: Medium Adult)   Pulse 86   Temp 97.8 °F (36.6 °C) (Temporal)   Resp 14   Wt 157 lb 12.8 oz (71.6 kg)   LMP 08/12/2020   BMI 27.30 kg/m²        Physical Exam  Vitals signs and nursing note reviewed. Constitutional:       General: She is not in acute distress. Appearance: She is well-developed. She is not diaphoretic.    Eyes:      General:         Right eye: No discharge. Left eye: No discharge. Conjunctiva/sclera: Conjunctivae normal.      Pupils: Pupils are equal, round, and reactive to light. Neck:      Musculoskeletal: Normal range of motion and neck supple. Vascular: No JVD. Cardiovascular:      Rate and Rhythm: Normal rate and regular rhythm. Heart sounds: Normal heart sounds. No murmur. Comments: Varicose veins on the left lower leg. Some spider veins of the right lower leg. Pulmonary:      Effort: Pulmonary effort is normal.      Breath sounds: Normal breath sounds. No wheezing or rales. Abdominal:      General: Bowel sounds are normal.      Palpations: Abdomen is soft. There is no mass. Tenderness: There is no abdominal tenderness. Musculoskeletal:         General: No deformity. Lymphadenopathy:      Cervical: No cervical adenopathy. Skin:     General: Skin is warm. Findings: No rash. Neurological:      Mental Status: She is alert and oriented to person, place, and time. Psychiatric:         Mood and Affect: Mood is not anxious or depressed. Speech: Speech normal.         Behavior: Behavior normal.         Thought Content: Thought content normal.         Judgment: Judgment normal.         Assessment:      Diagnosis Orders   1. Generalized anxiety disorder     2. Spider veins of both lower extremities     3. Varicose veins of both lower extremities with pain     4. Venous insufficiency of both lower extremities     5. Exercise induced bronchospasm  XR CHEST STANDARD (2 VW)    Full PFT Study With Bronchodilator   6.  Cough  XR CHEST STANDARD (2 VW)    Full PFT Study With Bronchodilator       Plan:     Proceed with continue Celexa at current dosage  Recommend stress relieving activities encouraged  Consider counseling if symptoms worsen  Daily exercise and healthy diet encouraged  Good sleep hygiene recommended  Follow-up with vascular surgery as recommended  Obtain chest x-ray  Obtain PFTs  Consider

## 2021-01-22 DIAGNOSIS — F41.1 GENERALIZED ANXIETY DISORDER: ICD-10-CM

## 2021-01-22 RX ORDER — CITALOPRAM 10 MG/1
10 TABLET ORAL DAILY
Qty: 30 TABLET | Refills: 5 | Status: SHIPPED | OUTPATIENT
Start: 2021-01-22 | End: 2021-08-16

## 2021-01-22 NOTE — TELEPHONE ENCOUNTER
LOV 8-12-20  LRF 7-13-20    Health Maintenance   Topic Date Due    Hepatitis C screen  1980    Pneumococcal 0-64 years Vaccine (1 of 1 - PPSV23) 09/10/1985    HIV screen  09/10/1994    Hepatitis B vaccine (1 of 3 - Risk 3-dose series) 09/10/1998    DTaP/Tdap/Td vaccine (1 - Tdap) 09/10/1998    Diabetic microalbuminuria test  08/26/2020    Lipid screen  08/26/2020    Flu vaccine (1) 09/01/2020    Diabetic foot exam  01/03/2021    A1C test (Diabetic or Prediabetic)  01/03/2021    Diabetic retinal exam  09/21/2021    Cervical cancer screen  02/18/2023    Hepatitis A vaccine  Aged Out    Hib vaccine  Aged Out    Meningococcal (ACWY) vaccine  Aged Out             (applicable per patient's age: Cancer Screenings, Depression Screening, Fall Risk Screening, Immunizations)    Hemoglobin A1C (%)   Date Value   01/03/2020 9.1   08/26/2019 8.0   09/11/2017 8.3     LDL Calculated (mg/dL)   Date Value   08/26/2019 103.2     AST (U/L)   Date Value   08/26/2019 22     ALT (U/L)   Date Value   08/26/2019 16     BUN (mg/dL)   Date Value   08/26/2019 18      (goal A1C is < 7)   (goal LDL is <100) need 30-50% reduction from baseline     BP Readings from Last 3 Encounters:   08/12/20 118/72   02/18/20 134/84   02/07/20 124/76    (goal /80)      All Future Testing planned in [facility]:  Lab Frequency Next Occurrence   PANDA DIGITAL SCREEN W CAD BILATERAL Once 01/21/2021   XR CHEST STANDARD (2 VW) Once 08/12/2020   Full PFT Study With Bronchodilator Once 08/12/2020       Next Visit Date:  Future Appointments   Date Time Provider Durna Estevez   2/22/2021 11:00 AM Maricel Hernandez DO [facility] [place]   2/24/2021  9:40 AM MD [facility] [place]            Patient Active Problem List:     Type 1 diabetes mellitus ([address]     Family history of melanoma     Varicose veins of legs     Generalized anxiety disorder     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

## 2021-02-22 ENCOUNTER — OFFICE VISIT (OUTPATIENT)
Dept: OBGYN CLINIC | Age: 41
End: 2021-02-22
Payer: COMMERCIAL

## 2021-02-22 VITALS
BODY MASS INDEX: 28.34 KG/M2 | SYSTOLIC BLOOD PRESSURE: 122 MMHG | HEIGHT: 64 IN | DIASTOLIC BLOOD PRESSURE: 80 MMHG | WEIGHT: 166 LBS

## 2021-02-22 DIAGNOSIS — Z30.09 FAMILY PLANNING: ICD-10-CM

## 2021-02-22 DIAGNOSIS — Z12.31 ENCOUNTER FOR SCREENING MAMMOGRAM FOR BREAST CANCER: ICD-10-CM

## 2021-02-22 DIAGNOSIS — Z01.419 ENCOUNTER FOR GYNECOLOGICAL EXAMINATION: Primary | ICD-10-CM

## 2021-02-22 PROCEDURE — 99396 PREV VISIT EST AGE 40-64: CPT | Performed by: STUDENT IN AN ORGANIZED HEALTH CARE EDUCATION/TRAINING PROGRAM

## 2021-02-22 RX ORDER — ETONOGESTREL AND ETHINYL ESTRADIOL 11.7; 2.7 MG/1; MG/1
1 INSERT, EXTENDED RELEASE VAGINAL SEE ADMIN INSTRUCTIONS
Qty: 3 EACH | Refills: 4 | Status: SHIPPED | OUTPATIENT
Start: 2021-02-22 | End: 2022-09-12 | Stop reason: SDUPTHER

## 2021-02-22 NOTE — PROGRESS NOTES
[facility] OB/GYN   [facility] [address] [facility],  [address]      Sharon Sanabria  2/22/2021                       Primary Care Physician: Romana Rumple, MD    CC:   Chief Complaint   Patient presents with    Annual Exam     Prev Pap: 2/18/20 WNL; Prev Emily: N/A         HPI: Sharon Sanabria is a 38 y.o. female [id number] Patient's last menstrual period was 01/25/2021. The patient was seen and examined. She is here for an annual visit. She is complaining of nothing. She denies irregular/heavy bleeding and dysmenorrhea. Her periods are regular and last 4 days. She describes them as light. Her bowel habits are regular. She denies any bloating. She denies dysuria. She denies urinary leaking. She denies vaginal discharge. She is sexually active with single partner, contraception - NuvaRing vaginal inserts. She uses NuvaRing vaginal inserts for contraception and is not desiring pregnancy.     Depression Screen: Symptoms of decreased mood absent  Symptoms of anhedonia absent  **If either question is answered in a  positive fashion then complete the PHQ9 Scoring Evaluation and make the appropriate referral**    REVIEW OF SYSTEMS:   Constitutional: negative fever, negative chills  HEENT: negative visual disturbances, negative headaches  Respiratory: negative dyspnea, negative cough  Cardiovascular: negative chest pain,  negative palpitations  Gastrointestinal: negative abdominal pain, negative RUQ pain, negative N/V, negative diarrhea, negative constipation  Genitourinary: negative dysuria, negative vaginal discharge  Dermatological: negative rash  Hematologic: negative bruising  Immunologic/Lymphatic: negative recent illness, negative recent sick contact  Musculoskeletal: negative back pain, negative myalgias, negative arthralgias  Neurological:  negative dizziness, negative weakness  Behavior/Psych: negative depression, negative anxiety      GYNECOLOGICAL HISTORY:  Age of Menarche: 14  Age of Menopause: N/A     STD History: no past history    Pap History: Last PAP was normal; 2020. Colposcopy History: denies    Permanent Sterilization: no  Reversible Birth Control: yes - NuvaRing  Hormone Replacement Exposure: no    OBSTETRICAL HISTORY:  OB History    Para Term  AB Living   4 2 2 0 2 2   SAB TAB Ectopic Molar Multiple Live Births   0 0 0 0 0 2      # Outcome Date GA Lbr Benjamin/2nd Weight Sex Delivery Anes PTL Lv   4 AB            3 AB            2 Term      Vag-Spont   KEISHA   1 Term      CS-Unspec   KEISHA       PAST MEDICAL HISTORY:   has a past medical history of Diabetes type 1, controlled ([address] Fracture of elbow, and 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 PAST SURGICAL HISTORY:   has a past surgical history that includes  section and Elbow surgery. ALLERGIES:  has No Known Allergies. MEDICATIONS:  Prior to Admission medications    Medication Sig Start Date End Date Taking?  Authorizing Provider   etonogestrel-ethinyl estradiol (NUVARING) 0.12-0.015 MG/24HR vaginal ring Place 1 each vaginally See Admin Instructions 21  Yes Scott Alvares,    citalopram (CELEXA) 10 MG tablet Take 1 tablet by mouth daily 21 Yes Luis Eduardo Max MD   Insulin Degludec (TRESIBA FLEXTOUCH) 100 UNIT/ML SOPN Inject 25 Units into the skin   Yes Historical Provider, MD   Insulin Aspart Prot & Aspart (NOVOLOG MIX 70/30 PENFILL SC) Inject into the skin Sliding scale   Yes Historical Provider, MD   Multiple Vitamins-Minerals (THERAPEUTIC MULTIVITAMIN-MINERALS) tablet Take 1 tablet by mouth daily   Yes Historical Provider, MD   [address] TEST strip  13   Historical Provider, MD   B-D INS SYRINGE 0.5CC/30GX1/2\" 30G X 1/2\" 0.5 ML MISC  13   Historical Provider, MD       FAMILY HISTORY:  Family History of Breast, Ovarian, Colon or Uterine Cancer: No   family history includes Cancer in her mother and paternal grandfather; Diabetes in her brother and sister; Hypertension in her mother; Other in her paternal grandmother; Pancreatic Cancer (age of onset: 62) in her maternal uncle; Stroke in her maternal grandfather. SOCIAL HISTORY:   reports that she has never smoked. She has never used smokeless tobacco. She reports that she does not drink alcohol or use drugs. HEALTH MAINTENANCE:  Immunization status: stated as up to date, no records available  Gregorio immunization: discussed    ROUTINE GYN HEALTH MAINTENANCE  Mammogram: N/A. Ordered today. Colonoscopy: N/A  Pap Smears: Last 2/2020 Neg. Next due 2/2023. Family Planning: NuvaRing  DEXA: N/A    VITALS:  Vitals:    02/22/21 1057   BP: 122/80   Position: Sitting   Cuff Size: Medium Adult   Weight: 166 lb (75.3 kg)   Height: 5' 3.75\" (1.619 m)                                                                                                                                                                                                        PHYSICAL EXAM:   General Appearance: Appears healthy. Alert; in no acute distress. Pleasant. Skin: Skin color, texture, turgor normal. No rashes or lesions. HEENT: normocephalic and atraumatic, Thyroid normal to inspection and palpation  Respiratory: Normal expansion. Clear to auscultation. No rales, rhonchi, or wheezing.   Cardiovascular: normal rate, normal S1 and S2, no gallops, intact distal pulses and no carotid bruits  Breast:  (Chest): normal appearance, no masses or tenderness  Abdomen: soft, non-tender, non-distended, no right upper quadrant tenderness and no CVA tenderness  Pelvic Exam:   External genitalia: General appearance; normal, Hair distribution; normal, Lesions absent  Urinary system: urethral meatus normal  Vaginal: normal mucosa, no discharge  Cervix: normal appearing cervix without discharge or lesions  Adnexa: non-palpable  Uterus: normal single, nontender  Rectal Exam: exam declined by patient  Musculoskeletal: no gross abnormalities  Extremities: non-tender BLE and non-edematous  Psych:  oriented to time, place and person     DATA:  No results found for this visit on 02/22/21. ASSESSMENT & PLAN:    Allyson Boykin is a 38 y.o. female [id number] Patient's last menstrual period was 01/25/2021. Annual:   Mammogram: N/A. Ordered today. Colonoscopy: N/A   Pap Smears: Last 2/2020 Neg. Next due 2/2023. Family Planning: NuvaRing   DEXA: N/A    Patient Active Problem List    Diagnosis Date Noted    Mild nonproliferative diabetic retinopathy without macular edema associated with type 1 diabetes mellitus ([facility] 04/26/2017    Generalized anxiety disorder 08/20/2015    Type 1 diabetes mellitus ([facility] 07/03/2013    Family history of melanoma 07/03/2013    Varicose veins of legs 07/03/2013       Return in about 1 year (around 2/22/2022) for Annual.  No Patient Care Coordination Note on file. Counseling Completed:    Discussed need for repeat pap as per American Society for Colposcopy and Cervical Pathology guidelines. Discussed need for mammograms every 1 year, If >42 yo and last mammogram was negative. Discussed Calcium and Vitamin D dosing. Discussed need for colonoscopy screening as well as onset for bone density testing. Discussed birth control and barrier recommendations. Discussed STD counseling and prevention. Discussed Gardisil counseling for all patients 10-35 yo. Hereditary Breast, Ovarian, Colon and Uterine Cancer screening discussed. Tobacco & Secondary smoke risks discussed; with recommendation for cessation and avoidance. Routine health maintenance per patients PCP discussed. Diagnosis Orders   1. Encounter for gynecological examination     2. Encounter for screening mammogram for breast cancer  PANDA DIGITAL SCREEN W OR WO CAD BILATERAL   3.  Family planning  etonogestrel-ethinyl estradiol (NUVARING) 0.12-0.015 MG/24HR vaginal ring        Ladell Dire

## 2021-02-24 ENCOUNTER — OFFICE VISIT (OUTPATIENT)
Dept: FAMILY MEDICINE CLINIC | Age: 41
End: 2021-02-24
Payer: COMMERCIAL

## 2021-02-24 VITALS
RESPIRATION RATE: 16 BRPM | BODY MASS INDEX: 28.72 KG/M2 | TEMPERATURE: 98.3 F | DIASTOLIC BLOOD PRESSURE: 82 MMHG | HEART RATE: 95 BPM | WEIGHT: 166 LBS | SYSTOLIC BLOOD PRESSURE: 124 MMHG

## 2021-02-24 DIAGNOSIS — F41.1 GENERALIZED ANXIETY DISORDER: Primary | ICD-10-CM

## 2021-02-24 DIAGNOSIS — I83.93 SPIDER VEINS OF BOTH LOWER EXTREMITIES: ICD-10-CM

## 2021-02-24 DIAGNOSIS — R05.9 COUGH: ICD-10-CM

## 2021-02-24 DIAGNOSIS — I87.2 VENOUS INSUFFICIENCY OF BOTH LOWER EXTREMITIES: ICD-10-CM

## 2021-02-24 DIAGNOSIS — I83.813 VARICOSE VEINS OF BOTH LOWER EXTREMITIES WITH PAIN: ICD-10-CM

## 2021-02-24 DIAGNOSIS — J45.990 EXERCISE INDUCED BRONCHOSPASM: ICD-10-CM

## 2021-02-24 PROCEDURE — 99214 OFFICE O/P EST MOD 30 MIN: CPT | Performed by: PEDIATRICS

## 2021-02-24 RX ORDER — BLOOD-GLUCOSE TRANSMITTER
EACH MISCELLANEOUS
COMMUNITY
Start: 2021-02-22 | End: 2022-02-24

## 2021-02-24 RX ORDER — ALPRAZOLAM 0.5 MG/1
0.25 TABLET ORAL DAILY PRN
Qty: 15 TABLET | Refills: 0 | Status: SHIPPED | OUTPATIENT
Start: 2021-02-24 | End: 2021-08-23 | Stop reason: SDUPTHER

## 2021-02-24 RX ORDER — ALBUTEROL SULFATE 90 UG/1
2 AEROSOL, METERED RESPIRATORY (INHALATION) 4 TIMES DAILY PRN
Qty: 1 INHALER | Refills: 0 | Status: SHIPPED | OUTPATIENT
Start: 2021-02-24

## 2021-02-24 RX ORDER — BLOOD-GLUCOSE SENSOR
EACH MISCELLANEOUS
COMMUNITY
Start: 2021-01-12 | End: 2022-02-24

## 2021-02-24 ASSESSMENT — PATIENT HEALTH QUESTIONNAIRE - PHQ9
SUM OF ALL RESPONSES TO PHQ QUESTIONS 1-9: 0
SUM OF ALL RESPONSES TO PHQ9 QUESTIONS 1 & 2: 0

## 2021-02-24 ASSESSMENT — ENCOUNTER SYMPTOMS
RHINORRHEA: 0
COUGH: 1
CONSTIPATION: 0
CHEST TIGHTNESS: 1
WHEEZING: 0
EYE REDNESS: 0
DIARRHEA: 0
EYE DISCHARGE: 0
EYE PAIN: 0
VOMITING: 0
PHOTOPHOBIA: 0
STRIDOR: 0
SHORTNESS OF BREATH: 1
COLOR CHANGE: 0
SORE THROAT: 0

## 2021-02-24 NOTE — PROGRESS NOTES
Kimmie Gutierrez (:  1980) is a 38 y.o. female,Established patient, here for evaluation of the following chief complaint(s):  Depression ([facility] ) and Anxiety ([facility] )      ASSESSMENT/PLAN:    1. Generalized anxiety disorder  -     ALPRAZolam (XANAX) 0.5 MG tablet; Take 0.5 tablets by mouth daily as needed for Sleep or Anxiety for up to 30 days. , Disp-15 tablet, R-0Normal  2. Spider veins of both lower extremities  -     AFL - Quince Carrel, MD, Vascular Surgery, [place]  3. Varicose veins of both lower extremities with pain  -     AFL - Quince Carrel, MD, Vascular Surgery, [place]  4. Venous insufficiency of both lower extremities  -     AFL - Quince Carrel, MD, Vascular Surgery, [place]  5. Cough  -     albuterol sulfate HFA (VENTOLIN HFA) 108 (90 Base) MCG/ACT inhaler; Inhale 2 puffs into the lungs 4 times daily as needed for Wheezing, Disp-1 Inhaler, R-0Normal  6. Exercise induced bronchospasm  -     albuterol sulfate HFA (VENTOLIN HFA) 108 (90 Base) MCG/ACT inhaler; Inhale 2 puffs into the lungs 4 times daily as needed for Wheezing, Disp-1 Inhaler, R-0Normal      Proceed with continue Celexa at current dosage and consider adjusting if anxiety worsens  Refill Xanax to use as needed  Stress reducing activities encouraged  Daily exercise and healthy diet encouraged  Good sleep hygiene recommended  Referral to vascular surgery for evaluation of spider veins, varicose veins and venous insufficiency previously diagnosed by another vascular surgeon who was not able to see her because of insurance restrictions  Trial of albuterol as needed prior to exercise for suspected exercise-induced asthma  Obtain chest x-ray and pulmonary function tests  Call with concerns      Return in about 6 months (around 2021) for routine follow up. SUBJECTIVE/OBJECTIVE:    HPI    Patient presents today for routine follow-up of generalized anxiety disorder.   Overall she states that her anxiety has been doing fairly well however she has been under a lot of stress and certain situations have been causing her some increased anxiety. .  She states that her grandmother did pass away recently and she has not been getting along well with her mother. She states that her mother has a negative attitude about everything and this has been very difficult for her to come to terms with. She does not think that she needs to increase her medication. She denies overt depression. She does have a prescription for Xanax that she uses as needed usually prior to doctor's appointments and if she becomes more anxious. She states that this works well when she uses this appropriately. She does have a history of painful spider veins and varicose veins of the lower legs. She did see Dr. Kenia Waldrop and had an ultrasound done that did verify her spider and varicose veins as well as some venous insufficiency. It was recommended that she have a procedure done to correct these varicose veins however Dr. Kenia Waldrop is not covered under her insurance. She was given a referral to Dr. Rod Wesley previously but had not seen him because of Covid. She would like a new referral.  She does tell me that she does still cough when she exercises. She states that normally this is with exercise that is extreme. She states that she coughs for about an hour or two after exercise. She was given an order for chest x-ray and pulmonary function test at her last visit but she did not have these done. She does still continue to have symptoms and she is willing to try an inhaler prior to exercise. She will still consider having testing done once the Covid 19 pandemic improves. She does report that she did see her diabetic specialist recently and they did do blood work which she does think included all of her screening blood work. She has no other concerns at this time.   cmw      Review of Systems   Constitutional: Negative for appetite change, fatigue and fever. HENT: Negative for congestion, postnasal drip, rhinorrhea and sore throat. Eyes: Negative for photophobia, pain, discharge, redness and visual disturbance. Respiratory: Positive for cough, chest tightness and shortness of breath. Negative for wheezing and stridor. Exercise induced cough and shortness of breath    Cardiovascular: Negative for chest pain, palpitations and leg swelling. Painful varicose veins (left greater than right) and some spider veins on the right lower leg - tender at times. Gastrointestinal: Negative for constipation, diarrhea and vomiting. Endocrine: Negative for polydipsia, polyphagia and polyuria. Genitourinary: Negative for decreased urine volume, dysuria, frequency, hematuria, pelvic pain and urgency. Musculoskeletal: Negative for arthralgias and joint swelling. Skin: Negative for color change and rash. Allergic/Immunologic: Negative for immunocompromised state. Neurological: Negative for dizziness, weakness, light-headedness, numbness and headaches. Hematological: Negative for adenopathy. Does not bruise/bleed easily. Psychiatric/Behavioral: Negative for agitation, dysphoric mood and sleep disturbance. The patient is nervous/anxious (controlled with celexa). Physical Exam  Vitals signs and nursing note reviewed. Constitutional:       General: She is not in acute distress. Appearance: She is well-developed. She is not diaphoretic. Eyes:      General:         Right eye: No discharge. Left eye: No discharge. Conjunctiva/sclera: Conjunctivae normal.      Pupils: Pupils are equal, round, and reactive to light. Neck:      Musculoskeletal: Normal range of motion and neck supple. Vascular: No JVD. Cardiovascular:      Rate and Rhythm: Normal rate and regular rhythm. Heart sounds: Normal heart sounds. No murmur. Comments: Varicose veins on the left lower leg.   Some spider veins of the right lower leg. Pulmonary:      Effort: Pulmonary effort is normal.      Breath sounds: Normal breath sounds. No wheezing or rales. Abdominal:      General: Bowel sounds are normal.      Palpations: Abdomen is soft. There is no mass. Tenderness: There is no abdominal tenderness. Musculoskeletal:         General: No deformity. Lymphadenopathy:      Cervical: No cervical adenopathy. Skin:     General: Skin is warm. Findings: No rash. Neurological:      Mental Status: She is alert and oriented to person, place, and time. Psychiatric:         Mood and Affect: Mood is not anxious or depressed. Speech: Speech normal.         Behavior: Behavior normal.         Thought Content: Thought content normal.         Judgment: Judgment normal.           On this date 02/24/21 I have spent greater than 35 minutes reviewing previous notes, test results and face to face with the patient discussing the diagnosis and importance of compliance with the treatment plan as well as documenting on the day of the visit. An electronic signature was used to authenticate this note.     --Carolina Pinto MD

## 2021-02-26 ENCOUNTER — TELEPHONE (OUTPATIENT)
Dept: OBGYN CLINIC | Age: 41
End: 2021-02-26

## 2021-02-26 DIAGNOSIS — Z30.09 FAMILY PLANNING: Primary | ICD-10-CM

## 2021-02-26 RX ORDER — ETONOGESTREL AND ETHINYL ESTRADIOL 11.7; 2.7 MG/1; MG/1
1 INSERT, EXTENDED RELEASE VAGINAL
Qty: 3 EACH | Refills: 11 | Status: SHIPPED | OUTPATIENT
Start: 2021-02-26 | End: 2022-03-22 | Stop reason: SDUPTHER

## 2021-02-26 NOTE — TELEPHONE ENCOUNTER
39 y/o Pt calling LMOR nurse AVILA at 10:18 am, NuvaRing not refilled at the time of her annual exam(02/22/21 w/) and needs to start new one this Sunday. Rx sent 02/26/21 as noted in  notes for family planning.

## 2021-04-02 LAB
ALBUMIN SERPL-MCNC: 3.9 G/DL
ALP BLD-CCNC: 62 U/L
ALT SERPL-CCNC: 19 U/L
ANION GAP SERPL CALCULATED.3IONS-SCNC: 11 MMOL/L
AST SERPL-CCNC: 19 U/L
BILIRUB SERPL-MCNC: 0.8 MG/DL (ref 0.1–1.4)
BUN BLDV-MCNC: 19 MG/DL
CALCIUM SERPL-MCNC: 9 MG/DL
CHLORIDE BLD-SCNC: 101 MMOL/L
CO2: 25 MMOL/L
CREAT SERPL-MCNC: 0.71 MG/DL
GFR CALCULATED: 91
GLUCOSE BLD-MCNC: 162 MG/DL
POTASSIUM SERPL-SCNC: 4 MMOL/L
SODIUM BLD-SCNC: 137 MMOL/L
TOTAL PROTEIN: 7.6

## 2021-08-14 DIAGNOSIS — F41.1 GENERALIZED ANXIETY DISORDER: ICD-10-CM

## 2021-08-16 NOTE — TELEPHONE ENCOUNTER
LOV 2-24-21  LRF 1-22-21    Health Maintenance   Topic Date Due    Hepatitis C screen  Never done    COVID-19 Vaccine (1) Never done    HIV screen  Never done    Hepatitis B vaccine (1 of 3 - Risk 3-dose series) Never done    DTaP/Tdap/Td vaccine (1 - Tdap) Never done    Diabetic microalbuminuria test  08/26/2020    Lipid screen  08/26/2020    Diabetic foot exam  01/03/2021    A1C test (Diabetic or Prediabetic)  01/03/2021    Pneumococcal 0-64 years Vaccine (1 of 2 - PPSV23) 02/24/2022 (Originally 9/10/1985)    Flu vaccine (1) 09/01/2021    Diabetic retinal exam  09/21/2021    Cervical cancer screen  02/18/2023    Hepatitis A vaccine  Aged Out    Hib vaccine  Aged Out    Meningococcal (ACWY) vaccine  Aged Out             (applicable per patient's age: Cancer Screenings, Depression Screening, Fall Risk Screening, Immunizations)    Hemoglobin A1C (%)   Date Value   01/03/2020 9.1   08/26/2019 8.0   09/11/2017 8.3     LDL Calculated (mg/dL)   Date Value   08/26/2019 103.2     AST (U/L)   Date Value   04/02/2021 19     ALT (U/L)   Date Value   04/02/2021 19     BUN (mg/dL)   Date Value   04/02/2021 19      (goal A1C is < 7)   (goal LDL is <100) need 30-50% reduction from baseline     BP Readings from Last 3 Encounters:   02/24/21 124/82   02/22/21 122/80   08/12/20 118/72    (goal /80)      All Future Testing planned in [facility]:  Lab Frequency Next Occurrence   PANDA DIGITAL SCREEN W OR WO CAD BILATERAL Once 09/09/2021       Next Visit Date:  Future Appointments   Date Time Provider Duran Estevez   8/23/2021  9:30 AM MD Lindy Parsons [facility] Francis Slot   2/28/2022 11:00 AM DO Sánchez Petersen Francis Slot            Patient Active Problem List:     Type 1 diabetes mellitus ([address]     Family history of melanoma     Varicose veins of legs     Generalized anxiety disorder     Mild nonproliferative diabetic retinopathy without macular edema associated with type 1 diabetes mellitus ([facility]

## 2021-08-17 RX ORDER — CITALOPRAM 10 MG/1
10 TABLET ORAL DAILY
Qty: 30 TABLET | Refills: 5 | Status: SHIPPED | OUTPATIENT
Start: 2021-08-17 | End: 2022-03-24

## 2021-08-23 ENCOUNTER — OFFICE VISIT (OUTPATIENT)
Dept: FAMILY MEDICINE CLINIC | Age: 41
End: 2021-08-23
Payer: COMMERCIAL

## 2021-08-23 VITALS
DIASTOLIC BLOOD PRESSURE: 76 MMHG | RESPIRATION RATE: 14 BRPM | BODY MASS INDEX: 28.65 KG/M2 | WEIGHT: 165.6 LBS | HEART RATE: 77 BPM | SYSTOLIC BLOOD PRESSURE: 124 MMHG | TEMPERATURE: 97.6 F

## 2021-08-23 DIAGNOSIS — I87.2 VENOUS INSUFFICIENCY OF BOTH LOWER EXTREMITIES: ICD-10-CM

## 2021-08-23 DIAGNOSIS — I83.813 VARICOSE VEINS OF BOTH LOWER EXTREMITIES WITH PAIN: ICD-10-CM

## 2021-08-23 DIAGNOSIS — I83.93 SPIDER VEINS OF BOTH LOWER EXTREMITIES: ICD-10-CM

## 2021-08-23 DIAGNOSIS — E10.9 TYPE 1 DIABETES MELLITUS WITHOUT COMPLICATION (HCC): ICD-10-CM

## 2021-08-23 DIAGNOSIS — Z51.81 THERAPEUTIC DRUG MONITORING: ICD-10-CM

## 2021-08-23 DIAGNOSIS — F41.1 GENERALIZED ANXIETY DISORDER: Primary | ICD-10-CM

## 2021-08-23 LAB
ALCOHOL URINE: NEGATIVE
AMPHETAMINE SCREEN, URINE: NEGATIVE
BARBITURATE SCREEN, URINE: NEGATIVE
BENZODIAZEPINE SCREEN, URINE: NEGATIVE
BUPRENORPHINE URINE: NEGATIVE
COCAINE METABOLITE SCREEN URINE: NEGATIVE
FENTANYL SCREEN, URINE: NEGATIVE
GABAPENTIN SCREEN, URINE: NEGATIVE
MDMA URINE: NEGATIVE
METHADONE SCREEN, URINE: NEGATIVE
METHAMPHETAMINE, URINE: NEGATIVE
OPIATE SCREEN URINE: NEGATIVE
OXYCODONE SCREEN URINE: NEGATIVE
PHENCYCLIDINE SCREEN URINE: NEGATIVE
PROPOXYPHENE SCREEN, URINE: NEGATIVE
SYNTHETIC CANNABINOIDS(K2) SCREEN, URINE: NEGATIVE
THC SCREEN, URINE: NEGATIVE
TRAMADOL SCREEN URINE: NEGATIVE
TRICYCLIC ANTIDEPRESSANTS, UR: NEGATIVE

## 2021-08-23 PROCEDURE — 99214 OFFICE O/P EST MOD 30 MIN: CPT | Performed by: PEDIATRICS

## 2021-08-23 PROCEDURE — 80305 DRUG TEST PRSMV DIR OPT OBS: CPT | Performed by: PEDIATRICS

## 2021-08-23 RX ORDER — ALPRAZOLAM 0.5 MG/1
0.25 TABLET ORAL DAILY PRN
Qty: 15 TABLET | Refills: 0 | Status: SHIPPED | OUTPATIENT
Start: 2021-08-23 | End: 2022-02-21 | Stop reason: SDUPTHER

## 2021-08-23 SDOH — ECONOMIC STABILITY: FOOD INSECURITY: WITHIN THE PAST 12 MONTHS, THE FOOD YOU BOUGHT JUST DIDN'T LAST AND YOU DIDN'T HAVE MONEY TO GET MORE.: NEVER TRUE

## 2021-08-23 SDOH — ECONOMIC STABILITY: FOOD INSECURITY: WITHIN THE PAST 12 MONTHS, YOU WORRIED THAT YOUR FOOD WOULD RUN OUT BEFORE YOU GOT MONEY TO BUY MORE.: NEVER TRUE

## 2021-08-23 ASSESSMENT — ENCOUNTER SYMPTOMS
COUGH: 0
CHEST TIGHTNESS: 0
CONSTIPATION: 0
SORE THROAT: 0
RHINORRHEA: 0
DIARRHEA: 0
PHOTOPHOBIA: 0
STRIDOR: 0
WHEEZING: 0
EYE PAIN: 0
VOMITING: 0
COLOR CHANGE: 0
SHORTNESS OF BREATH: 0
EYE DISCHARGE: 0
EYE REDNESS: 0

## 2021-08-23 ASSESSMENT — SOCIAL DETERMINANTS OF HEALTH (SDOH): HOW HARD IS IT FOR YOU TO PAY FOR THE VERY BASICS LIKE FOOD, HOUSING, MEDICAL CARE, AND HEATING?: NOT HARD AT ALL

## 2021-08-23 NOTE — PROGRESS NOTES
Víctor Howard (:  1980) is a 38 y.o. female,Established patient, here for evaluation of the following chief complaint(s): Anxiety         ASSESSMENT/PLAN:    1. Generalized anxiety disorder  -     ALPRAZolam (XANAX) 0.5 MG tablet; Take 0.5 tablets by mouth daily as needed for Sleep or Anxiety for up to 30 days. , Disp-15 tablet, R-0Normal  2. Spider veins of both lower extremities  3. Varicose veins of both lower extremities with pain  4. Venous insufficiency of both lower extremities  5. Type 1 diabetes mellitus without complication (HCC)  6. Therapeutic drug monitoring  -     POCT Rapid Drug Screen    Proceed with continue current medications   Obtain POCT drug screen   Sign CSM  Obtain notes and labs from endo to update health maintenance  Healthy diet and regular exercise encouraged   Follow up with vascular surgery prn   Follow up with endo as scheduled   Call with concerns        Return in about 6 months (around 2022) for routine follow up. Subjective     HPI    Patient presents today for routine follow-up of generalized anxiety disorder. Overall she states she has been doing very well and her anxiety has been very well controlled. She does take Celexa 10 mg once daily and she will use Xanax as needed for severe anxiety. Her last prescription was in February and she only has 1 pill left. It has been sometime since she has taken one. She denies any side effects from her medication. She does have a history of painful spider veins and varicose veins of the lower legs along with some venous insufficiency. She did see Dr. Lindi Baumgarten for consultation and she did have a procedure done on the left leg in April. She states that this was quite painful and she felt everything during the procedure. She has noticed some improvement in her varicose veins but still has a varicose vein on the left anterior leg that sticks out somewhat.   She does have a history of type 1 diabetes and sees endocrinology for routine follow-up of this. She does see Dr. Christina Tao every 4 months. She states that they have done all of her routine screening blood work. We will attempt to get this copy. She has no other concerns at this time. cmw      Review of Systems   Constitutional: Negative for appetite change, fatigue and fever. HENT: Negative for congestion, postnasal drip, rhinorrhea and sore throat. Eyes: Negative for photophobia, pain, discharge, redness and visual disturbance. Respiratory: Negative for cough, chest tightness, shortness of breath, wheezing and stridor. Cardiovascular: Negative for chest pain, palpitations and leg swelling. Painful varicose veins (left greater than right) and some spider veins on the right lower leg - tender at times. Mildly improved after procedure. Gastrointestinal: Negative for constipation, diarrhea and vomiting. Endocrine: Negative for polydipsia, polyphagia and polyuria. Genitourinary: Negative for decreased urine volume, dysuria, frequency, hematuria, pelvic pain and urgency. Musculoskeletal: Negative for arthralgias and joint swelling. Skin: Negative for color change and rash. Allergic/Immunologic: Negative for immunocompromised state. Neurological: Negative for dizziness, weakness, light-headedness, numbness and headaches. Hematological: Negative for adenopathy. Does not bruise/bleed easily. Psychiatric/Behavioral: Negative for agitation, dysphoric mood and sleep disturbance. The patient is nervous/anxious (controlled with celexa). Objective      Physical Exam  Vitals and nursing note reviewed. Constitutional:       General: She is not in acute distress. Appearance: She is well-developed. She is not diaphoretic. HENT:      Head:      Comments: Some excessive cerumen in the ear canals     Right Ear: Tympanic membrane, ear canal and external ear normal. There is no impacted cerumen.       Left Ear: Tympanic membrane, ear canal and external ear normal.      Nose: Nose normal. No congestion or rhinorrhea. Mouth/Throat:      Mouth: Mucous membranes are moist.   Eyes:      General:         Right eye: No discharge. Left eye: No discharge. Conjunctiva/sclera: Conjunctivae normal.      Pupils: Pupils are equal, round, and reactive to light. Neck:      Vascular: No JVD. Cardiovascular:      Rate and Rhythm: Normal rate and regular rhythm. Heart sounds: Normal heart sounds. No murmur heard. Comments: Varicose veins on the anterior left lower leg mildly swollen. Some spider veins of the right lower leg. Pulmonary:      Effort: Pulmonary effort is normal.      Breath sounds: Normal breath sounds. No wheezing or rales. Abdominal:      General: Bowel sounds are normal.      Palpations: Abdomen is soft. There is no mass. Tenderness: There is no abdominal tenderness. There is no right CVA tenderness or left CVA tenderness. Musculoskeletal:         General: No deformity. Cervical back: Normal range of motion and neck supple. Lymphadenopathy:      Cervical: No cervical adenopathy. Skin:     General: Skin is warm. Findings: No rash. Neurological:      Mental Status: She is alert and oriented to person, place, and time. Psychiatric:         Mood and Affect: Mood is not anxious or depressed. Speech: Speech normal.         Behavior: Behavior normal.         Thought Content: Thought content normal.         Judgment: Judgment normal.              An electronic signature was used to authenticate this note.     --Ale Gomez MD

## 2021-08-24 LAB
AVERAGE GLUCOSE: 102
HBA1C MFR BLD: 8.2 %

## 2022-02-21 ENCOUNTER — OFFICE VISIT (OUTPATIENT)
Dept: FAMILY MEDICINE CLINIC | Age: 42
End: 2022-02-21
Payer: COMMERCIAL

## 2022-02-21 VITALS
WEIGHT: 168 LBS | BODY MASS INDEX: 29.06 KG/M2 | DIASTOLIC BLOOD PRESSURE: 80 MMHG | TEMPERATURE: 98 F | RESPIRATION RATE: 14 BRPM | HEART RATE: 92 BPM | SYSTOLIC BLOOD PRESSURE: 122 MMHG

## 2022-02-21 DIAGNOSIS — F41.1 GENERALIZED ANXIETY DISORDER: Primary | ICD-10-CM

## 2022-02-21 DIAGNOSIS — I83.813 VARICOSE VEINS OF BOTH LOWER EXTREMITIES WITH PAIN: ICD-10-CM

## 2022-02-21 DIAGNOSIS — E10.9 TYPE 1 DIABETES MELLITUS WITHOUT COMPLICATION (HCC): ICD-10-CM

## 2022-02-21 DIAGNOSIS — I83.93 SPIDER VEINS OF BOTH LOWER EXTREMITIES: ICD-10-CM

## 2022-02-21 DIAGNOSIS — I87.2 VENOUS INSUFFICIENCY OF BOTH LOWER EXTREMITIES: ICD-10-CM

## 2022-02-21 PROCEDURE — 99214 OFFICE O/P EST MOD 30 MIN: CPT | Performed by: PEDIATRICS

## 2022-02-21 RX ORDER — ALPRAZOLAM 0.5 MG/1
0.25 TABLET ORAL DAILY PRN
Qty: 15 TABLET | Refills: 0 | Status: SHIPPED | OUTPATIENT
Start: 2022-02-21 | End: 2022-08-26 | Stop reason: SDUPTHER

## 2022-02-21 ASSESSMENT — ENCOUNTER SYMPTOMS
PHOTOPHOBIA: 0
VOMITING: 0
CONSTIPATION: 0
STRIDOR: 0
CHEST TIGHTNESS: 0
WHEEZING: 0
SHORTNESS OF BREATH: 0
COUGH: 0
EYE PAIN: 0
EYE DISCHARGE: 0
EYE REDNESS: 0
COLOR CHANGE: 0
DIARRHEA: 0
RHINORRHEA: 0
SORE THROAT: 0

## 2022-02-21 NOTE — PROGRESS NOTES
Matt Yanes (:  1980) is a 42 y.o. female,Established patient, here for evaluation of the following chief complaint(s): Anxiety and Depression         ASSESSMENT/PLAN:    1. Generalized anxiety disorder  -     ALPRAZolam (XANAX) 0.5 MG tablet; Take 0.5 tablets by mouth daily as needed for Sleep or Anxiety for up to 30 days. , Disp-15 tablet, R-0Normal  2. Spider veins of both lower extremities  3. Varicose veins of both lower extremities with pain  4. Venous insufficiency of both lower extremities  5. Type 1 diabetes mellitus without complication (HCC)        Continue current medications  Xanax as needed for severe anxiety   Follow up with vascular surgery prn   Follow up with endo as scheduled   Call with concerns     Return in about 6 months (around 2022) for routine follow up. Subjective     HPI    Patient presents today for routine follow-up of generalized anxiety disorder. Overall she states she has been doing well and her symptoms are very well controlled. She does take Celexa 10 mg once daily and she uses Xanax as needed for severe anxiety. Her last prescription of Xanax was filled in 2021. She does request a refill today. She denies any side effects from her medication. She does have a history of painful spider veins and varicose veins of the lower legs along with some venous insufficiency. She did see vascular surgery, Dr. James Mckinley for consultation and she did have a procedure done on the left leg in 2021. She has noticed some improvement in her varicose veins but the left leg still bothers her somewhat. She also has varicose veins of the right leg that bother her. I did advise her to follow-up with vascular surgery to discuss these concerns. She does have a history of type 1 diabetes and sees endocrinology for routine follow-up. Her most recent hemoglobin A1c was the best it has been in a long time. This was 7.2.   She has been wearing a Dexcom intermittently which is helped her keep track of her blood sugars better than previously. She will be seeing Dr. Chaitanya Kessler later this year. She does have an order for all of her routine blood work. She has no other concerns at this time  cmw        Review of Systems   Constitutional: Negative for appetite change, fatigue and fever. HENT: Negative for congestion, postnasal drip, rhinorrhea and sore throat. Eyes: Negative for photophobia, pain, discharge, redness and visual disturbance. Respiratory: Negative for cough, chest tightness, shortness of breath, wheezing and stridor. Cardiovascular: Negative for chest pain, palpitations and leg swelling. Painful varicose veins (left greater than right) and some spider veins on the right lower leg - tender at times. Mildly improved after procedure. Gastrointestinal: Negative for constipation, diarrhea and vomiting. Endocrine: Negative for polydipsia, polyphagia and polyuria. Genitourinary: Negative for decreased urine volume, dysuria, frequency, hematuria, pelvic pain and urgency. Musculoskeletal: Negative for arthralgias and joint swelling. Skin: Negative for color change and rash. Allergic/Immunologic: Negative for immunocompromised state. Neurological: Negative for dizziness, weakness, light-headedness, numbness and headaches. Hematological: Negative for adenopathy. Does not bruise/bleed easily. Psychiatric/Behavioral: Negative for agitation, dysphoric mood and sleep disturbance. The patient is nervous/anxious (controlled with celexa). Objective      Physical Exam  Vitals and nursing note reviewed. Constitutional:       General: She is not in acute distress. Appearance: She is well-developed. She is not diaphoretic. HENT:      Head:      Comments: Some excessive cerumen in the ear canals     Right Ear: Tympanic membrane, ear canal and external ear normal. There is no impacted cerumen.       Left Ear: Tympanic membrane, ear canal and external ear normal.      Nose: Nose normal. No congestion or rhinorrhea. Mouth/Throat:      Mouth: Mucous membranes are moist.   Eyes:      General:         Right eye: No discharge. Left eye: No discharge. Conjunctiva/sclera: Conjunctivae normal.      Pupils: Pupils are equal, round, and reactive to light. Neck:      Vascular: No JVD. Cardiovascular:      Rate and Rhythm: Normal rate and regular rhythm. Heart sounds: Normal heart sounds. No murmur heard. Comments: Varicose veins on the anterior left lower leg mildly swollen. Some spider veins of the right lower leg. Pulmonary:      Effort: Pulmonary effort is normal.      Breath sounds: Normal breath sounds. No wheezing or rales. Abdominal:      General: Bowel sounds are normal.      Palpations: Abdomen is soft. There is no mass. Tenderness: There is no abdominal tenderness. There is no right CVA tenderness or left CVA tenderness. Musculoskeletal:         General: No deformity. Cervical back: Normal range of motion and neck supple. Right lower leg: No edema. Left lower leg: No edema. Lymphadenopathy:      Cervical: No cervical adenopathy. Skin:     General: Skin is warm. Findings: No rash. Neurological:      General: No focal deficit present. Mental Status: She is alert and oriented to person, place, and time. Cranial Nerves: No cranial nerve deficit. Motor: No weakness. Psychiatric:         Mood and Affect: Mood normal. Mood is not anxious or depressed. Speech: Speech normal.         Behavior: Behavior normal.         Thought Content: Thought content normal.         Judgment: Judgment normal.            An electronic signature was used to authenticate this note.     --Boston Serna MD

## 2022-03-22 DIAGNOSIS — Z30.09 FAMILY PLANNING: ICD-10-CM

## 2022-03-22 RX ORDER — ETONOGESTREL AND ETHINYL ESTRADIOL 11.7; 2.7 MG/1; MG/1
INSERT, EXTENDED RELEASE VAGINAL
Qty: 3 EACH | Refills: 3 | Status: SHIPPED | OUTPATIENT
Start: 2022-03-22 | End: 2022-08-26 | Stop reason: SDUPTHER

## 2022-03-24 DIAGNOSIS — F41.1 GENERALIZED ANXIETY DISORDER: ICD-10-CM

## 2022-03-24 RX ORDER — CITALOPRAM 10 MG/1
TABLET ORAL
Qty: 30 TABLET | Refills: 5 | Status: SHIPPED | OUTPATIENT
Start: 2022-03-24 | End: 2022-08-26 | Stop reason: SDUPTHER

## 2022-03-24 NOTE — TELEPHONE ENCOUNTER
Last visit: 02/21/22  Last Med refill: 08/17/21  Does patient have enough medication for 72 hours: Yes    Next Visit Date:  Future Appointments   Date Time Provider Duran Estevez   4/11/2022  1:30 PM DO Una Polo [facility]   8/26/2022 10:30 AM Milagros Phan MD [address] Maintenance   Topic Date Due    COVID-19 Vaccine (1) Never done    Pneumococcal 0-64 years Vaccine (1 of 2 - PPSV23) Never done    Depression Screen  Never done    Hepatitis B vaccine (1 of 3 - Risk 3-dose series) Never done    DTaP/Tdap/Td vaccine (1 - Tdap) Never done    Diabetic microalbuminuria test  08/26/2020    Lipid screen  08/26/2020    Diabetic foot exam  01/03/2021    A1C test (Diabetic or Prediabetic)  01/03/2021    Flu vaccine (1) Never done    Diabetic retinal exam  10/09/2022    Cervical cancer screen  02/18/2023    Hepatitis A vaccine  Aged Out    Hib vaccine  Aged Out    Meningococcal (ACWY) vaccine  Aged Out    Hepatitis C screen  Discontinued    HIV screen  Discontinued       Hemoglobin A1C (%)   Date Value   01/03/2020 9.1   08/26/2019 8.0   09/11/2017 8.3             ( goal A1C is < 7)   No results found for: LABMICR  LDL Calculated (mg/dL)   Date Value   08/26/2019 103.2   05/10/2017 82.4       (goal LDL is <100)   AST (U/L)   Date Value   04/02/2021 19     ALT (U/L)   Date Value   04/02/2021 19     BUN (mg/dL)   Date Value   04/02/2021 19     BP Readings from Last 3 Encounters:   02/21/22 122/80   08/23/21 124/76   02/24/21 124/82          (goal 120/80)    All Future Testing planned in [facility]  Lab Frequency Next Occurrence               Patient Active Problem List:     Type 1 diabetes mellitus (HCC)     Family history of melanoma     Varicose veins of legs     Generalized anxiety disorder     Mild nonproliferative diabetic retinopathy without macular edema associated with type 1 diabetes mellitus ([address]

## 2022-04-11 ENCOUNTER — OFFICE VISIT (OUTPATIENT)
Dept: OBGYN CLINIC | Age: 42
End: 2022-04-11
Payer: COMMERCIAL

## 2022-04-11 VITALS
WEIGHT: 168.8 LBS | HEIGHT: 64 IN | DIASTOLIC BLOOD PRESSURE: 86 MMHG | BODY MASS INDEX: 28.82 KG/M2 | SYSTOLIC BLOOD PRESSURE: 131 MMHG

## 2022-04-11 DIAGNOSIS — Z01.419 WOMEN'S ANNUAL ROUTINE GYNECOLOGICAL EXAMINATION: Primary | ICD-10-CM

## 2022-04-11 DIAGNOSIS — Z12.31 ENCOUNTER FOR SCREENING MAMMOGRAM FOR MALIGNANT NEOPLASM OF BREAST: ICD-10-CM

## 2022-04-11 LAB
AVERAGE GLUCOSE: NORMAL
HBA1C MFR BLD: 7.2 %

## 2022-04-11 PROCEDURE — 99396 PREV VISIT EST AGE 40-64: CPT | Performed by: STUDENT IN AN ORGANIZED HEALTH CARE EDUCATION/TRAINING PROGRAM

## 2022-04-11 NOTE — PROGRESS NOTES
[address] [facility]  [address] [facility], [address] [place] [address]      Eugene Malloy  4/11/2022                       Primary Care Physician: Lonnie Mary MD    CC:   Chief Complaint   Patient presents with    Annual Exam     2/2020 [facility]          HPI: Eugene Malloy is a 42 y.o. female [id number] No LMP recorded (lmp unknown). The patient was seen and examined. She is here for an annual visit. She is complaining of nothing. She denies irregular/heavy bleeding and dysmenorrhea. Her periods are regular and last 4 days. She describes them as light. Her bowel habits are regular. She denies any bloating. She denies dysuria. She denies urinary leaking. She denies vaginal discharge. She is sexually active with single partner, contraception - NuvaRing vaginal inserts. She uses NuvaRing vaginal inserts for contraception and is not desiring pregnancy.     Depression Screen: Symptoms of decreased mood absent  Symptoms of anhedonia absent  **If either question is answered in a  positive fashion then complete the PHQ9 Scoring Evaluation and make the appropriate referral**    REVIEW OF SYSTEMS:   Constitutional: negative fever, negative chills  HEENT: negative visual disturbances, negative headaches  Respiratory: negative dyspnea, negative cough  Cardiovascular: negative chest pain,  negative palpitations  Gastrointestinal: negative abdominal pain, negative RUQ pain, negative N/V, negative diarrhea, negative constipation  Genitourinary: negative dysuria, negative vaginal discharge  Dermatological: negative rash  Hematologic: negative bruising  Immunologic/Lymphatic: negative recent illness, negative recent sick contact  Musculoskeletal: negative back pain, negative myalgias, negative arthralgias  Neurological:  negative dizziness, negative weakness  Behavior/Psych: negative depression, negative anxiety      GYNECOLOGICAL HISTORY:  Age of Menarche: 14  Age of Menopause: N/A STD History: no past history    Permanent Sterilization: no  Reversible Birth Control: yes - NuvaRing  Hormone Replacement Exposure: no    OBSTETRICAL HISTORY:  OB History    Para Term  AB Living   4 2 2 0 2 2   SAB IAB Ectopic Molar Multiple Live Births   0 0 0 0 0 2      # Outcome Date GA Lbr Benjamin/2nd Weight Sex Delivery Anes PTL Lv   4 AB            3 AB            2 Term      Vag-Spont   KEISHA   1 Term      CS-Unspec   KEISHA       PAST MEDICAL HISTORY:   has a past medical history of Diabetes type 1, controlled ([address] Fracture of elbow, and 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 PAST SURGICAL HISTORY:   has a past surgical history that includes  section and Elbow surgery. ALLERGIES:  has No Known Allergies. MEDICATIONS:  Prior to Admission medications    Medication Sig Start Date End Date Taking?  Authorizing Provider   citalopram (CELEXA) 10 MG tablet take 1 tablet by mouth once daily 3/24/22  Yes Jose Alberto Ga MD   etonogestrel-ethinyl estradiol ([address]) 0.12-0.015 MG/24HR vaginal ring Place 1 each vaginally See Admin Instructions 21  Yes Flavia Kenny DO   Insulin Degludec (TRESIBA FLEXTOUCH) 100 UNIT/ML SOPN Inject 25 Units into the skin   Yes Historical Provider, MD   Insulin Aspart Prot & Aspart (NOVOLOG MIX 70/30 PENFILL SC) Inject into the skin Sliding scale   Yes Historical Provider, MD   Multiple Vitamins-Minerals (THERAPEUTIC MULTIVITAMIN-MINERALS) tablet Take 1 tablet by mouth daily   Yes Historical Provider, MD   etonogestrel-ethinyl estradiol (NUVARING) 0.12-0.015 MG/24HR vaginal ring insert 1 ring vaginally for 3 weeks REMOVE for 1 week and repeat 3/22/22 3/22/23  Flavia Kenny DO   albuterol sulfate HFA (VENTOLIN HFA) 108 (90 Base) MCG/ACT inhaler Inhale 2 puffs into the lungs 4 times daily as needed for Wheezing  Patient not taking: Reported on 2022 Alejandra Moya MD   ONE TOUCH ULTRA TEST strip  6/18/13   Historical Provider, MD HOROWITZ INS SYRINGE 0.5CC/30GX1/2\" 30G X 1/2\" 0.5 ML MISC  6/19/13   Historical Provider, MD       FAMILY HISTORY:  Family History of Breast, Ovarian, Colon or Uterine Cancer: No   family history includes Cancer in her mother and paternal grandfather; Diabetes in her brother and sister; Hypertension in her mother; Other in her paternal grandmother; Pancreatic Cancer (age of onset: 62) in her maternal uncle; Stroke in her maternal grandfather. SOCIAL HISTORY:   reports that she has never smoked. She has never used smokeless tobacco. She reports that she does not drink alcohol and does not use drugs. HEALTH MAINTENANCE:  Immunization status: stated as up to date, no records available  Tevinil immunization: discussed    ROUTINE GYN HEALTH MAINTENANCE  Mammogram: Normal per patient. Getting records. Colonoscopy: N/A  Pap Smears: Last 2/20 Neg. Next due 2/23. Family Planning: NuvaRing  DEXA: N/A    VITALS:  Vitals:    04/11/22 1323   BP: 131/86   Weight: 168 lb 12.8 oz (76.6 kg)   Height: 5' 3.5\" (1.613 m)                                                                                                                                                                                                   PHYSICAL EXAM:   General Appearance: Appears healthy. Alert; in no acute distress. Pleasant. Skin: Skin color, texture, turgor normal. No rashes or lesions. HEENT: normocephalic and atraumatic, Thyroid normal to inspection and palpation  Respiratory: Normal expansion. Clear to auscultation. No rales, rhonchi, or wheezing.   Cardiovascular: normal rate, normal S1 and S2, no gallops, intact distal pulses and no carotid bruits  Breast:  (Chest): normal appearance, no masses or tenderness  Abdomen: soft, non-tender, non-distended, no right upper quadrant tenderness and no CVA tenderness  Pelvic Exam:   External genitalia: General appearance; normal, Hair distribution; normal, Lesions absent  Urinary system: urethral meatus normal  Vaginal: normal mucosa, no discharge  Cervix: normal appearing cervix without discharge or lesions  Adnexa: non-palpable  Uterus: normal single, nontender  Rectal Exam: exam declined by patient  Musculoskeletal: no gross abnormalities  Extremities: non-tender BLE and non-edematous  Psych:  oriented to time, place and person     DATA:  No results found for this visit on 04/11/22. ASSESSMENT & PLAN:    Kaylene Henson is a 42 y.o. female [id number] No LMP recorded (lmp unknown). - She declined the Garidisil immunization    Annual:   Mammogram: Normal per patient. Getting records. Colonoscopy: N/A   Pap Smears: Last 2/20 Neg. Next due 2/23. 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  No Patient Care Coordination Note on file. Counseling Completed:    Discussed need for repeat pap as per American Society for Colposcopy and Cervical Pathology guidelines. Discussed need for mammograms every 1 year, If >44 yo and last mammogram was negative. Discussed Calcium and Vitamin D dosing. Discussed need for colonoscopy screening as well as onset for bone density testing. Discussed birth control and barrier recommendations. Discussed STD counseling and prevention. Discussed Gardisil counseling for all patients 10-37 yo. Hereditary Breast, Ovarian, Colon and Uterine Cancer screening discussed. Tobacco & Secondary smoke risks discussed; with recommendation for cessation and avoidance.   Routine health maintenance per patients PCP discussed. Diagnosis Orders   1. Women's annual routine gynecological examination     2.  Encounter for screening mammogram for malignant neoplasm of breast  PANDA DIGITAL SCREEN W OR WO CAD [address]  4/11/2022, 1:55 PM

## 2022-08-26 ENCOUNTER — OFFICE VISIT (OUTPATIENT)
Dept: FAMILY MEDICINE CLINIC | Age: 42
End: 2022-08-26
Payer: COMMERCIAL

## 2022-08-26 VITALS
WEIGHT: 167 LBS | OXYGEN SATURATION: 97 % | HEART RATE: 80 BPM | SYSTOLIC BLOOD PRESSURE: 116 MMHG | RESPIRATION RATE: 16 BRPM | DIASTOLIC BLOOD PRESSURE: 68 MMHG | BODY MASS INDEX: 29.12 KG/M2 | TEMPERATURE: 97.8 F

## 2022-08-26 DIAGNOSIS — Z51.81 THERAPEUTIC DRUG MONITORING: ICD-10-CM

## 2022-08-26 DIAGNOSIS — F41.1 GENERALIZED ANXIETY DISORDER: Primary | ICD-10-CM

## 2022-08-26 LAB
ALCOHOL URINE: NORMAL
AMPHETAMINE SCREEN, URINE: NEGATIVE
BARBITURATE SCREEN, URINE: NEGATIVE
BENZODIAZEPINE SCREEN, URINE: NEGATIVE
BUPRENORPHINE URINE: NEGATIVE
COCAINE METABOLITE SCREEN URINE: NEGATIVE
FENTANYL SCREEN, URINE: NEGATIVE
GABAPENTIN SCREEN, URINE: NORMAL
MDMA URINE: NEGATIVE
METHADONE SCREEN, URINE: NEGATIVE
METHAMPHETAMINE, URINE: NEGATIVE
OPIATE SCREEN URINE: NORMAL
OXYCODONE SCREEN URINE: NEGATIVE
PHENCYCLIDINE SCREEN URINE: NEGATIVE
PROPOXYPHENE SCREEN, URINE: NORMAL
SYNTHETIC CANNABINOIDS(K2) SCREEN, URINE: NORMAL
THC SCREEN, URINE: NEGATIVE
TRAMADOL SCREEN URINE: NORMAL
TRICYCLIC ANTIDEPRESSANTS, UR: NORMAL

## 2022-08-26 PROCEDURE — 99214 OFFICE O/P EST MOD 30 MIN: CPT | Performed by: PEDIATRICS

## 2022-08-26 PROCEDURE — 80305 DRUG TEST PRSMV DIR OPT OBS: CPT | Performed by: PEDIATRICS

## 2022-08-26 RX ORDER — ALPRAZOLAM 0.5 MG/1
0.25 TABLET ORAL DAILY PRN
Qty: 15 TABLET | Refills: 0 | Status: SHIPPED | OUTPATIENT
Start: 2022-08-26 | End: 2022-09-25

## 2022-08-26 RX ORDER — CITALOPRAM 10 MG/1
TABLET ORAL
Qty: 30 TABLET | Refills: 5 | Status: SHIPPED | OUTPATIENT
Start: 2022-08-26

## 2022-08-26 RX ORDER — FLASH GLUCOSE SENSOR
KIT MISCELLANEOUS
COMMUNITY

## 2022-08-26 SDOH — ECONOMIC STABILITY: FOOD INSECURITY: WITHIN THE PAST 12 MONTHS, YOU WORRIED THAT YOUR FOOD WOULD RUN OUT BEFORE YOU GOT MONEY TO BUY MORE.: NEVER TRUE

## 2022-08-26 SDOH — ECONOMIC STABILITY: FOOD INSECURITY: WITHIN THE PAST 12 MONTHS, THE FOOD YOU BOUGHT JUST DIDN'T LAST AND YOU DIDN'T HAVE MONEY TO GET MORE.: NEVER TRUE

## 2022-08-26 ASSESSMENT — PATIENT HEALTH QUESTIONNAIRE - PHQ9
SUM OF ALL RESPONSES TO PHQ QUESTIONS 1-9: 0
SUM OF ALL RESPONSES TO PHQ QUESTIONS 1-9: 0
1. LITTLE INTEREST OR PLEASURE IN DOING THINGS: 0
SUM OF ALL RESPONSES TO PHQ QUESTIONS 1-9: 0
2. FEELING DOWN, DEPRESSED OR HOPELESS: 0
SUM OF ALL RESPONSES TO PHQ9 QUESTIONS 1 & 2: 0
SUM OF ALL RESPONSES TO PHQ QUESTIONS 1-9: 0

## 2022-08-26 ASSESSMENT — SOCIAL DETERMINANTS OF HEALTH (SDOH): HOW HARD IS IT FOR YOU TO PAY FOR THE VERY BASICS LIKE FOOD, HOUSING, MEDICAL CARE, AND HEATING?: NOT HARD AT ALL

## 2022-08-26 NOTE — PROGRESS NOTES
Michelle Duque (:  1980) is a 42 y.o. female,Established patient, here for evaluation of the following chief complaint(s): Anxiety and Depression         ASSESSMENT/PLAN:    1. Generalized anxiety disorder  -     ALPRAZolam (XANAX) 0.5 MG tablet; Take 0.5 tablets by mouth daily as needed for Sleep or Anxiety for up to 30 days. , Disp-15 tablet, R-0Normal  -     citalopram (CELEXA) 10 MG tablet; take 1 tablet by mouth once daily, Disp-30 tablet, R-5Normal  2. Therapeutic drug monitoring  -     POCT Rapid Drug Screen      Continue Celexa at current dosage  Continue Xanax as needed for severe anxiety  Healthy diet and regular exercise encouraged  Good sleep hygiene recommended  Obtain POCT urine drug screen  CSM updated  Call with concerns        Return in about 6 months (around 2023) for routine follow up. Subjective     HPI    Patient presents today for routine follow-up of generalized anxiety disorder. She does continue on Celexa 10 mg once daily and Xanax as needed for severe anxiety. Her last refill of Xanax was 6 months ago. She uses this very appropriately. Overall she states she has been doing well and her symptoms are very well controlled with her current medications. She denies any depression or significant anxiety. She does have rare episodes of anxiety but her Xanax does help in the situations. She states that she is sleeping well. She denies any side effects from her medication. cmw      Review of Systems   Constitutional:  Negative for appetite change, fatigue and fever. HENT:  Negative for congestion, postnasal drip, rhinorrhea and sore throat. Eyes:  Negative for photophobia, pain, discharge, redness and visual disturbance. Respiratory:  Negative for cough, chest tightness, shortness of breath, wheezing and stridor. Cardiovascular:  Negative for chest pain, palpitations and leg swelling. Gastrointestinal:  Negative for constipation, diarrhea and vomiting. Endocrine: Negative for polydipsia, polyphagia and polyuria. Genitourinary:  Negative for decreased urine volume, dysuria, frequency, hematuria, pelvic pain and urgency. Musculoskeletal:  Negative for arthralgias and joint swelling. Skin:  Negative for color change and rash. Allergic/Immunologic: Negative for immunocompromised state. Neurological:  Negative for dizziness, weakness, light-headedness, numbness and headaches. Hematological:  Negative for adenopathy. Does not bruise/bleed easily. Psychiatric/Behavioral:  Negative for agitation, dysphoric mood and sleep disturbance. The patient is nervous/anxious (controlled with celexa). Objective     Physical Exam  Vitals and nursing note reviewed. Constitutional:       General: She is not in acute distress. Appearance: She is well-developed. She is not diaphoretic. HENT:      Head:      Comments: Some excessive cerumen in the ear canals     Right Ear: Tympanic membrane, ear canal and external ear normal. There is no impacted cerumen. Left Ear: Tympanic membrane, ear canal and external ear normal.      Nose: Nose normal. No congestion or rhinorrhea. Mouth/Throat:      Mouth: Mucous membranes are moist.   Eyes:      General:         Right eye: No discharge. Left eye: No discharge. Conjunctiva/sclera: Conjunctivae normal.      Pupils: Pupils are equal, round, and reactive to light. Neck:      Vascular: No JVD. Cardiovascular:      Rate and Rhythm: Normal rate and regular rhythm. Heart sounds: Normal heart sounds. No murmur heard. Comments: Varicose veins on the anterior left lower leg mildly swollen. Some spider veins of the right lower leg. Pulmonary:      Effort: Pulmonary effort is normal.      Breath sounds: Normal breath sounds. No wheezing or rales. Abdominal:      General: Bowel sounds are normal.      Palpations: Abdomen is soft. There is no mass. Tenderness:  There is no abdominal tenderness. There is no right CVA tenderness or left CVA tenderness. Musculoskeletal:         General: No deformity. Cervical back: Normal range of motion and neck supple. Right lower leg: No edema. Left lower leg: No edema. Lymphadenopathy:      Cervical: No cervical adenopathy. Skin:     General: Skin is warm. Findings: No rash. Neurological:      General: No focal deficit present. Mental Status: She is alert and oriented to person, place, and time. Cranial Nerves: No cranial nerve deficit. Motor: No weakness. Psychiatric:         Mood and Affect: Mood normal. Mood is not anxious or depressed. Speech: Speech normal.         Behavior: Behavior normal.         Thought Content: Thought content normal.         Judgment: Judgment normal.            An electronic signature was used to authenticate this note.     --May Kendrick MD

## 2022-08-30 ASSESSMENT — ENCOUNTER SYMPTOMS
EYE PAIN: 0
CONSTIPATION: 0
WHEEZING: 0
COUGH: 0
PHOTOPHOBIA: 0
DIARRHEA: 0
VOMITING: 0
STRIDOR: 0
EYE REDNESS: 0
EYE DISCHARGE: 0
SORE THROAT: 0
RHINORRHEA: 0
SHORTNESS OF BREATH: 0
COLOR CHANGE: 0
CHEST TIGHTNESS: 0

## 2022-09-12 ENCOUNTER — TELEPHONE (OUTPATIENT)
Dept: OBGYN CLINIC | Age: 42
End: 2022-09-12

## 2022-09-12 DIAGNOSIS — Z30.09 FAMILY PLANNING: ICD-10-CM

## 2022-09-12 RX ORDER — ETONOGESTREL AND ETHINYL ESTRADIOL 11.7; 2.7 MG/1; MG/1
1 INSERT, EXTENDED RELEASE VAGINAL SEE ADMIN INSTRUCTIONS
Qty: 3 EACH | Refills: 4 | Status: SHIPPED | OUTPATIENT
Start: 2022-09-12

## 2022-10-31 LAB
ALBUMIN SERPL-MCNC: 4 G/DL
ALP BLD-CCNC: 69 U/L
ALT SERPL-CCNC: 11 U/L
ANION GAP SERPL CALCULATED.3IONS-SCNC: 12 MMOL/L
AST SERPL-CCNC: 13 U/L
BILIRUB SERPL-MCNC: 0.5 MG/DL (ref 0.1–1.4)
BUN BLDV-MCNC: 16 MG/DL
CALCIUM SERPL-MCNC: 9.3 MG/DL
CHLORIDE BLD-SCNC: 103 MMOL/L
CHOLESTEROL, TOTAL: 216 MG/DL
CHOLESTEROL/HDL RATIO: 2.8
CO2: 25 MMOL/L
CREAT SERPL-MCNC: 0.76 MG/DL
CREATININE URINE: 44.95 MG/DL
EGFR: >90
GLUCOSE BLD-MCNC: 144 MG/DL
HDLC SERPL-MCNC: 77 MG/DL (ref 35–70)
LDL CHOLESTEROL CALCULATED: 121 MG/DL (ref 0–160)
MICROALBUMIN/CREAT 24H UR: 1.7 MG/G{CREAT}
NONHDLC SERPL-MCNC: ABNORMAL MG/DL
POTASSIUM SERPL-SCNC: 4.2 MMOL/L
SODIUM BLD-SCNC: 140 MMOL/L
T4 FREE: 1
TOTAL PROTEIN: 7.4
TRIGL SERPL-MCNC: 91 MG/DL
TSH SERPL DL<=0.05 MIU/L-ACNC: 1.52 UIU/ML
VLDLC SERPL CALC-MCNC: 18 MG/DL

## 2023-02-27 ENCOUNTER — OFFICE VISIT (OUTPATIENT)
Dept: FAMILY MEDICINE CLINIC | Age: 43
End: 2023-02-27
Payer: COMMERCIAL

## 2023-02-27 VITALS
TEMPERATURE: 97.8 F | BODY MASS INDEX: 28.24 KG/M2 | DIASTOLIC BLOOD PRESSURE: 76 MMHG | WEIGHT: 165.4 LBS | HEIGHT: 64 IN | SYSTOLIC BLOOD PRESSURE: 124 MMHG | HEART RATE: 84 BPM

## 2023-02-27 DIAGNOSIS — E66.3 OVERWEIGHT (BMI 25.0-29.9): ICD-10-CM

## 2023-02-27 DIAGNOSIS — F41.1 GENERALIZED ANXIETY DISORDER: Primary | ICD-10-CM

## 2023-02-27 PROCEDURE — 99214 OFFICE O/P EST MOD 30 MIN: CPT | Performed by: PEDIATRICS

## 2023-02-27 RX ORDER — CITALOPRAM 10 MG/1
TABLET ORAL
Qty: 30 TABLET | Refills: 5 | Status: SHIPPED | OUTPATIENT
Start: 2023-02-27

## 2023-02-27 RX ORDER — BLOOD-GLUCOSE TRANSMITTER
EACH MISCELLANEOUS
COMMUNITY
Start: 2023-02-22 | End: 2024-02-23

## 2023-02-27 ASSESSMENT — ENCOUNTER SYMPTOMS
EYE DISCHARGE: 0
EYE PAIN: 0
VOMITING: 0
CONSTIPATION: 0
SHORTNESS OF BREATH: 0
RHINORRHEA: 0
EYE REDNESS: 0
WHEEZING: 0
SORE THROAT: 0
COLOR CHANGE: 0
DIARRHEA: 0
STRIDOR: 0
COUGH: 0
PHOTOPHOBIA: 0
CHEST TIGHTNESS: 0

## 2023-02-27 ASSESSMENT — PATIENT HEALTH QUESTIONNAIRE - PHQ9
SUM OF ALL RESPONSES TO PHQ QUESTIONS 1-9: 0
SUM OF ALL RESPONSES TO PHQ QUESTIONS 1-9: 0
2. FEELING DOWN, DEPRESSED OR HOPELESS: 0
SUM OF ALL RESPONSES TO PHQ QUESTIONS 1-9: 0
SUM OF ALL RESPONSES TO PHQ QUESTIONS 1-9: 0
SUM OF ALL RESPONSES TO PHQ9 QUESTIONS 1 & 2: 0
1. LITTLE INTEREST OR PLEASURE IN DOING THINGS: 0

## 2023-02-27 NOTE — PROGRESS NOTES
Sheryle Gull (:  1980) is a 36 y.o. female,Established patient, here for evaluation of the following chief complaint(s): Anxiety and Depression         ASSESSMENT/PLAN:    1. Generalized anxiety disorder  -     citalopram (CELEXA) 10 MG tablet; take 1 tablet by mouth once daily, Disp-30 tablet, R-5Normal  2. Overweight (BMI 25.0-29. 9)      Continue Celexa at current dosage  Continue Xanax as needed for severe anxiety  Healthy diet and regular exercise encouraged  Continued weight reduction encouraged  Good sleep hygiene recommended  POCT UDS and CSM are updated  Subspecialty follow-up as scheduled  Recommend COVID-19 vaccine  Recommend pneumococcal vaccine  Recommend hepatitis B series  Recommend Tdap booster  Recommend flu vaccine  Patient declines all vaccinations  Call with concerns      Return in about 6 months (around 2023) for routine follow up. Subjective     HPI    Patient presents today for routine follow-up of generalized anxiety disorder. She does continue on Celexa 10 mg once daily and Xanax as needed for severe anxiety. Her last refill of Xanax was 6 months ago and she still has medication left. She uses this very appropriately. Her UDS and CSM are up-to-date. She states she has been doing well and her symptoms are well controlled with her current medications. She denies any significant depression, anxiety or panic attacks. She does have rare episodes of anxiety usually at the doctor's office or in anxiety provoking situations but her Xanax does help keep her symptoms under control. She denies any side effects from her medicines. Her BMI is slightly elevated at 28 indicating an overweight status. She states that she has actually lost about 10 pounds as she did hit her maximum weight sometime ago.   She started calorie counting and has been participating in [employer] and or exercising on the [employer].  She does feel as though maybe she is had some early hormonal changes which has caused some of her symptoms including this weight gain and some constipation. She did start a probiotic which was helpful as well as increasing her exercise and eating a healthy diet. She does continue to use the NuvaRing for birth control. She does have regular menstrual cycles but they are very light. cmw        Review of Systems   Constitutional:  Positive for unexpected weight change (initially with weight gain but she has lost weight recently with healthy diet and exercise). Negative for appetite change, fatigue and fever. HENT:  Negative for congestion, postnasal drip, rhinorrhea and sore throat. Eyes:  Negative for photophobia, pain, discharge, redness and visual disturbance. Respiratory:  Negative for cough, chest tightness, shortness of breath, wheezing and stridor. Cardiovascular:  Negative for chest pain, palpitations and leg swelling. Gastrointestinal:  Negative for constipation, diarrhea and vomiting. Endocrine: Negative for polydipsia, polyphagia and polyuria. Genitourinary:  Negative for decreased urine volume, dysuria, frequency, hematuria, pelvic pain and urgency. Musculoskeletal:  Negative for arthralgias and joint swelling. Skin:  Negative for color change and rash. Allergic/Immunologic: Negative for immunocompromised state. Neurological:  Negative for dizziness, weakness, light-headedness, numbness and headaches. Hematological:  Negative for adenopathy. Does not bruise/bleed easily. Psychiatric/Behavioral:  Negative for agitation, dysphoric mood and sleep disturbance. The patient is nervous/anxious (controlled with celexa). Objective     Physical Exam  Vitals and nursing note reviewed. Constitutional:       General: She is not in acute distress. Appearance: She is well-developed. She is not diaphoretic.    HENT:      Head:      Comments: Some excessive cerumen in the ear canals     Right Ear: Tympanic membrane, ear canal and external ear normal. There is no impacted cerumen. Left Ear: Tympanic membrane, ear canal and external ear normal.      Nose: Nose normal. No congestion or rhinorrhea. Mouth/Throat:      Mouth: Mucous membranes are moist.   Eyes:      General:         Right eye: No discharge. Left eye: No discharge. Conjunctiva/sclera: Conjunctivae normal.      Pupils: Pupils are equal, round, and reactive to light. Neck:      Vascular: No JVD. Cardiovascular:      Rate and Rhythm: Normal rate and regular rhythm. Heart sounds: Normal heart sounds. No murmur heard. Comments: Varicose veins on the anterior left lower leg mildly swollen. Some spider veins of the right lower leg. Pulmonary:      Effort: Pulmonary effort is normal.      Breath sounds: Normal breath sounds. No wheezing or rales. Abdominal:      General: Bowel sounds are normal.      Palpations: Abdomen is soft. There is no mass. Tenderness: There is no abdominal tenderness. There is no right CVA tenderness or left CVA tenderness. Musculoskeletal:         General: No deformity. Cervical back: Normal range of motion and neck supple. Right lower leg: No edema. Left lower leg: No edema. Lymphadenopathy:      Cervical: No cervical adenopathy. Skin:     General: Skin is warm. Findings: No rash. Neurological:      General: No focal deficit present. Mental Status: She is alert and oriented to person, place, and time. Cranial Nerves: No cranial nerve deficit. Motor: No weakness. Psychiatric:         Mood and Affect: Mood normal. Mood is not anxious or depressed. Speech: Speech normal.         Behavior: Behavior normal.         Thought Content: Thought content normal.         Judgment: Judgment normal.                An electronic signature was used to authenticate this note.     --Zoila Krishna MD

## 2023-04-13 LAB — PAP SMEAR: NORMAL

## 2023-04-26 DIAGNOSIS — Z01.419 WELL WOMAN EXAM WITH ROUTINE GYNECOLOGICAL EXAM: ICD-10-CM

## 2023-08-28 ENCOUNTER — OFFICE VISIT (OUTPATIENT)
Dept: FAMILY MEDICINE CLINIC | Age: 43
End: 2023-08-28
Payer: COMMERCIAL

## 2023-08-28 VITALS
TEMPERATURE: 97.2 F | HEART RATE: 84 BPM | WEIGHT: 167.2 LBS | DIASTOLIC BLOOD PRESSURE: 78 MMHG | SYSTOLIC BLOOD PRESSURE: 126 MMHG | BODY MASS INDEX: 29.15 KG/M2

## 2023-08-28 DIAGNOSIS — F41.1 GENERALIZED ANXIETY DISORDER: Primary | ICD-10-CM

## 2023-08-28 DIAGNOSIS — I87.2 VENOUS INSUFFICIENCY OF BOTH LOWER EXTREMITIES: ICD-10-CM

## 2023-08-28 DIAGNOSIS — Z51.81 THERAPEUTIC DRUG MONITORING: ICD-10-CM

## 2023-08-28 DIAGNOSIS — I83.93 SPIDER VEINS OF BOTH LOWER EXTREMITIES: ICD-10-CM

## 2023-08-28 DIAGNOSIS — E10.9 TYPE 1 DIABETES MELLITUS WITHOUT COMPLICATION (HCC): ICD-10-CM

## 2023-08-28 DIAGNOSIS — I83.813 VARICOSE VEINS OF BOTH LOWER EXTREMITIES WITH PAIN: ICD-10-CM

## 2023-08-28 LAB
AMPHETAMINE+METHAMPHETAMINE URINE SCREEN: NEGATIVE
BARBITURATES, URINE: NEGATIVE
BENZODIAZEPINES, URINE: NEGATIVE
CANNABINOIDS, URINE: NEGATIVE
COCAINE, URINE: NEGATIVE
METHADONE, URINE: NEGATIVE
OPIATES, URINE: NEGATIVE
PHENCYCLIDINE, URINE: NEGATIVE
PROPOXYPHENE, URINE: NEGATIVE
TRICYCLIC ANTIDEPRESSANTS, UR: NEGATIVE

## 2023-08-28 PROCEDURE — 99214 OFFICE O/P EST MOD 30 MIN: CPT | Performed by: PEDIATRICS

## 2023-08-28 RX ORDER — CITALOPRAM HYDROBROMIDE 10 MG/1
TABLET ORAL
Qty: 30 TABLET | Refills: 5 | Status: SHIPPED | OUTPATIENT
Start: 2023-08-28

## 2023-08-28 SDOH — ECONOMIC STABILITY: FOOD INSECURITY: WITHIN THE PAST 12 MONTHS, YOU WORRIED THAT YOUR FOOD WOULD RUN OUT BEFORE YOU GOT MONEY TO BUY MORE.: NEVER TRUE

## 2023-08-28 SDOH — ECONOMIC STABILITY: HOUSING INSECURITY
IN THE LAST 12 MONTHS, WAS THERE A TIME WHEN YOU DID NOT HAVE A STEADY PLACE TO SLEEP OR SLEPT IN A SHELTER (INCLUDING NOW)?: NO

## 2023-08-28 SDOH — ECONOMIC STABILITY: FOOD INSECURITY: WITHIN THE PAST 12 MONTHS, THE FOOD YOU BOUGHT JUST DIDN'T LAST AND YOU DIDN'T HAVE MONEY TO GET MORE.: NEVER TRUE

## 2023-08-28 SDOH — ECONOMIC STABILITY: INCOME INSECURITY: HOW HARD IS IT FOR YOU TO PAY FOR THE VERY BASICS LIKE FOOD, HOUSING, MEDICAL CARE, AND HEATING?: NOT HARD AT ALL

## 2023-08-28 ASSESSMENT — ENCOUNTER SYMPTOMS
WHEEZING: 0
PHOTOPHOBIA: 0
VOMITING: 0
CHEST TIGHTNESS: 0
STRIDOR: 0
SHORTNESS OF BREATH: 0
RHINORRHEA: 0
EYE PAIN: 0
EYE DISCHARGE: 0
COUGH: 0
DIARRHEA: 0
COLOR CHANGE: 0
EYE REDNESS: 0
SORE THROAT: 0
CONSTIPATION: 0

## 2023-08-28 NOTE — PROGRESS NOTES
Kareen Goncalves (:  1980) is a 36 y.o. female,Established patient, here for evaluation of the following chief complaint(s): Anxiety         ASSESSMENT/PLAN:    1. Generalized anxiety disorder  -     citalopram (CELEXA) 10 MG tablet; take 1 tablet by mouth once daily, Disp-30 tablet, R-5Normal  2. Therapeutic drug monitoring  -     Urine Drug Screen; Future  3. Spider veins of both lower extremities  4. Varicose veins of both lower extremities with pain  5. Venous insufficiency of both lower extremities  6. Type 1 diabetes mellitus without complication (HCC)      Continue Celexa 10 mg once daily  Continue Xanax as needed for severe anxiety  Anxiety reducing behaviors recommended  Daily exercise and healthy diet encouraged  Good sleep hygiene recommended  Obtain UDS  CSM signed  Follow-up with endocrinology scheduled  Consider vascular follow-up if patient wishes  Multiple vaccines recommended including COVID-19 vaccine, pneumococcal vaccine, hepatitis B vaccine, Tdap vaccine and flu vaccine  Call with concerns      Return in about 6 months (around 2024) for routine follow up. Subjective     HPI    Patient presents today for routine follow-up of generalized anxiety disorder. She is currently treated with Celexa 10 mg once daily and she uses Xanax as needed for severe anxiety. She states her symptoms are very well controlled with her current medications. She denies any significant depression, anxiety or panic attacks. She really only has rare episodes of anxiety usually at the doctor's office or another anxiety provoking situations. She denies any side effects from her medication. Her UDS and CSM are due. She does have a history of type 1 diabetes and sees endocrinology for routine follow-up. She does have a history of painful spider veins and varicose veins of both lower legs as well as some venous insufficiency.   She did see Dr. Serjio Montero previously and did have a procedure done on

## 2024-02-28 ENCOUNTER — OFFICE VISIT (OUTPATIENT)
Dept: FAMILY MEDICINE CLINIC | Age: 44
End: 2024-02-28
Payer: COMMERCIAL

## 2024-02-28 VITALS
OXYGEN SATURATION: 97 % | SYSTOLIC BLOOD PRESSURE: 136 MMHG | HEART RATE: 81 BPM | DIASTOLIC BLOOD PRESSURE: 70 MMHG | HEIGHT: 64 IN | TEMPERATURE: 99.7 F | WEIGHT: 165 LBS | BODY MASS INDEX: 28.17 KG/M2

## 2024-02-28 DIAGNOSIS — Z12.31 SCREENING MAMMOGRAM FOR BREAST CANCER: ICD-10-CM

## 2024-02-28 DIAGNOSIS — F41.1 GENERALIZED ANXIETY DISORDER: Primary | ICD-10-CM

## 2024-02-28 DIAGNOSIS — N95.1 PERIMENOPAUSE: ICD-10-CM

## 2024-02-28 DIAGNOSIS — I83.93 SPIDER VEINS OF BOTH LOWER EXTREMITIES: ICD-10-CM

## 2024-02-28 DIAGNOSIS — I83.813 VARICOSE VEINS OF BOTH LOWER EXTREMITIES WITH PAIN: ICD-10-CM

## 2024-02-28 DIAGNOSIS — I87.2 VENOUS INSUFFICIENCY OF BOTH LOWER EXTREMITIES: ICD-10-CM

## 2024-02-28 PROCEDURE — 99214 OFFICE O/P EST MOD 30 MIN: CPT | Performed by: PEDIATRICS

## 2024-02-28 ASSESSMENT — ENCOUNTER SYMPTOMS
CHEST TIGHTNESS: 0
EYE REDNESS: 0
STRIDOR: 0
WHEEZING: 0
SHORTNESS OF BREATH: 0
EYE DISCHARGE: 0
DIARRHEA: 0
VOMITING: 0
SORE THROAT: 0
RHINORRHEA: 0
EYE PAIN: 0
COUGH: 0
PHOTOPHOBIA: 0
CONSTIPATION: 0
COLOR CHANGE: 0

## 2024-02-28 ASSESSMENT — PATIENT HEALTH QUESTIONNAIRE - PHQ9
2. FEELING DOWN, DEPRESSED OR HOPELESS: 0
SUM OF ALL RESPONSES TO PHQ QUESTIONS 1-9: 0
SUM OF ALL RESPONSES TO PHQ QUESTIONS 1-9: 0
SUM OF ALL RESPONSES TO PHQ9 QUESTIONS 1 & 2: 0
1. LITTLE INTEREST OR PLEASURE IN DOING THINGS: 0
SUM OF ALL RESPONSES TO PHQ QUESTIONS 1-9: 0
SUM OF ALL RESPONSES TO PHQ QUESTIONS 1-9: 0

## 2024-02-28 ASSESSMENT — ANXIETY QUESTIONNAIRES
GAD7 TOTAL SCORE: 3
3. WORRYING TOO MUCH ABOUT DIFFERENT THINGS: 0
7. FEELING AFRAID AS IF SOMETHING AWFUL MIGHT HAPPEN: 0
IF YOU CHECKED OFF ANY PROBLEMS ON THIS QUESTIONNAIRE, HOW DIFFICULT HAVE THESE PROBLEMS MADE IT FOR YOU TO DO YOUR WORK, TAKE CARE OF THINGS AT HOME, OR GET ALONG WITH OTHER PEOPLE: NOT DIFFICULT AT ALL
1. FEELING NERVOUS, ANXIOUS, OR ON EDGE: 0
2. NOT BEING ABLE TO STOP OR CONTROL WORRYING: 0
5. BEING SO RESTLESS THAT IT IS HARD TO SIT STILL: 0
6. BECOMING EASILY ANNOYED OR IRRITABLE: 0
4. TROUBLE RELAXING: 3

## 2024-02-28 NOTE — PROGRESS NOTES
Greer Ram (:  1980) is a 43 y.o. female,Established patient, here for evaluation of the following chief complaint(s):    Anxiety         ASSESSMENT/PLAN:    1. Generalized anxiety disorder  2. Perimenopause  3. Spider veins of both lower extremities  4. Varicose veins of both lower extremities with pain  5. Venous insufficiency of both lower extremities  6. Screening mammogram for breast cancer  -     PANDA ANTONIO DIGITAL SCREEN BILATERAL; Future      Continue Celexa 10 mg once daily  Continue Xanax as needed for severe anxiety  Anxiety reducing behaviors recommended  Daily exercise and healthy diet encouraged  Good sleep hygiene recommended  UDS and CSM are up to date  Follow-up with endocrinology as scheduled  Discuss concerns for perimenopause with endocrinology  Consider changing celexa to effexor if symptoms of perimenopause persist  Consider vascular follow-up if patient wishes  Obtain mammogram   Follow up with gynecology as scheduled  Multiple vaccines recommended including COVID-19 vaccine, pneumococcal vaccine, hepatitis B vaccine, Tdap vaccine and flu vaccine  Call with concerns         Return in about 6 months (around 2024) for routine follow up.         Subjective     HPI    Patient presents today for routine follow-up of generalized anxiety disorder.  She is currently treated with Celexa 10 mg once daily and she uses Xanax as needed for severe anxiety.  She states that her symptoms are well-controlled with her current medications.  She denies any significant depression, anxiety or panic attacks.  Once again she usually has anxiety at doctors visits for other anxiety provoking situations.  She denies any side effects from her medication.  She does tell me that she wonders if she is starting perimenopause because she has had some symptoms that suggest this.  She states that her sugars are bottoming out even though she is eating the same things that she always has.  She is a little nervous

## 2024-05-04 DIAGNOSIS — Z30.09 FAMILY PLANNING: ICD-10-CM

## 2024-05-06 RX ORDER — ETONOGESTREL/ETHINYL ESTRADIOL .12-.015MG
RING, VAGINAL VAGINAL
Qty: 1 EACH | Refills: 0 | Status: SHIPPED | OUTPATIENT
Start: 2024-05-06

## 2024-05-30 DIAGNOSIS — Z30.09 FAMILY PLANNING: ICD-10-CM

## 2024-05-30 RX ORDER — ETONOGESTREL/ETHINYL ESTRADIOL .12-.015MG
RING, VAGINAL VAGINAL
Qty: 3 EACH | Refills: 1 | Status: SHIPPED | OUTPATIENT
Start: 2024-05-30

## 2024-07-01 ENCOUNTER — OFFICE VISIT (OUTPATIENT)
Dept: OBGYN CLINIC | Age: 44
End: 2024-07-01
Payer: COMMERCIAL

## 2024-07-01 VITALS
BODY MASS INDEX: 30.05 KG/M2 | DIASTOLIC BLOOD PRESSURE: 80 MMHG | WEIGHT: 176 LBS | HEIGHT: 64 IN | SYSTOLIC BLOOD PRESSURE: 135 MMHG | HEART RATE: 79 BPM

## 2024-07-01 DIAGNOSIS — Z01.419 WELL WOMAN EXAM WITH ROUTINE GYNECOLOGICAL EXAM: Primary | ICD-10-CM

## 2024-07-01 DIAGNOSIS — Z30.09 FAMILY PLANNING: ICD-10-CM

## 2024-07-01 DIAGNOSIS — Z12.31 SCREENING MAMMOGRAM FOR BREAST CANCER: ICD-10-CM

## 2024-07-01 PROCEDURE — 99396 PREV VISIT EST AGE 40-64: CPT | Performed by: STUDENT IN AN ORGANIZED HEALTH CARE EDUCATION/TRAINING PROGRAM

## 2024-07-01 RX ORDER — ETONOGESTREL AND ETHINYL ESTRADIOL VAGINAL RING .015; .12 MG/D; MG/D
RING VAGINAL
Qty: 9 EACH | Refills: 3 | Status: SHIPPED | OUTPATIENT
Start: 2024-07-01

## 2024-07-01 NOTE — PROGRESS NOTES
[facility]  [facility]  [address]  [place] [address]      Greer Ram  2025                       Primary Care Physician: Gloria Darby MD    CC:   Chief Complaint   Patient presents with    Annual Exam         HPI: Greer Ram is a 43 y.o. female  Patient's last menstrual period was 2024 (within days).    The patient was seen and examined. She is here for an annual visit. She is complaining of nothgin.     She denies irregular/heavy bleeding and dysmenorrhea. Her periods are regular and last 5 days. She describes them as moderate.     Her bowel habits are regular. She denies any bloating.  She denies dysuria. She denies urinary leaking.  She denies vaginal discharge.  She is sexually active with single partner, contraception - NuvaRing vaginal inserts.  She uses NuvaRing vaginal inserts for contraception and is not desiring pregnancy.    Depression Screen: Symptoms of decreased mood absent  Symptoms of anhedonia absent  **If either question is answered in a  positive fashion then complete the PHQ9 Scoring Evaluation and make the appropriate referral**    REVIEW OF SYSTEMS:   Constitutional: negative fever, negative chills  HEENT: negative visual disturbances, negative headaches  Respiratory: negative dyspnea, negative cough  Cardiovascular: negative chest pain,  negative palpitations  Gastrointestinal: negative abdominal pain, negative RUQ pain, negative N/V, negative diarrhea, negative constipation  Genitourinary: negative dysuria, negative vaginal discharge  Dermatological: negative rash  Hematologic: negative bruising  Immunologic/Lymphatic: negative recent illness, negative recent sick contact  Musculoskeletal: negative back pain, negative myalgias, negative arthralgias  Neurological:  negative dizziness, negative weakness  Behavior/Psych: negative depression, negative anxiety      GYNECOLOGICAL HISTORY:  Age of Menarche: 11  Age of

## 2024-08-23 DIAGNOSIS — F41.1 GENERALIZED ANXIETY DISORDER: ICD-10-CM

## 2024-08-23 RX ORDER — CITALOPRAM HYDROBROMIDE 10 MG/1
TABLET ORAL
Qty: 30 TABLET | Refills: 5 | Status: SHIPPED | OUTPATIENT
Start: 2024-08-23

## 2024-08-23 NOTE — TELEPHONE ENCOUNTER
LOV 2/28/24   RTO 8/28/24  LRF 8/28/23    Pt had to switch from [facility] to [facility] since [facility] closed and they do not have refills for the celexa.         Controlled Substance Monitoring:    Acute and Chronic Pain Monitoring:   RX Monitoring Periodic Controlled Substance Monitoring   8/26/2022  11:32 AM No signs of potential drug abuse or diversion identified.;Random urine drug screen sent today.

## 2024-10-21 ENCOUNTER — OFFICE VISIT (OUTPATIENT)
Dept: FAMILY MEDICINE CLINIC | Age: 44
End: 2024-10-21
Payer: COMMERCIAL

## 2024-10-21 VITALS
DIASTOLIC BLOOD PRESSURE: 80 MMHG | WEIGHT: 176 LBS | BODY MASS INDEX: 30.05 KG/M2 | TEMPERATURE: 98.3 F | HEART RATE: 88 BPM | SYSTOLIC BLOOD PRESSURE: 116 MMHG | HEIGHT: 64 IN | OXYGEN SATURATION: 98 %

## 2024-10-21 DIAGNOSIS — F41.1 GENERALIZED ANXIETY DISORDER: Primary | ICD-10-CM

## 2024-10-21 DIAGNOSIS — I87.2 VENOUS INSUFFICIENCY OF BOTH LOWER EXTREMITIES: ICD-10-CM

## 2024-10-21 DIAGNOSIS — I83.93 SPIDER VEINS OF BOTH LOWER EXTREMITIES: ICD-10-CM

## 2024-10-21 DIAGNOSIS — Z51.81 THERAPEUTIC DRUG MONITORING: ICD-10-CM

## 2024-10-21 DIAGNOSIS — I83.813 VARICOSE VEINS OF BOTH LOWER EXTREMITIES WITH PAIN: ICD-10-CM

## 2024-10-21 DIAGNOSIS — E10.9 TYPE 1 DIABETES MELLITUS WITHOUT COMPLICATION (HCC): ICD-10-CM

## 2024-10-21 DIAGNOSIS — N95.1 PERIMENOPAUSE: ICD-10-CM

## 2024-10-21 LAB
ALCOHOL URINE: NORMAL
AMPHETAMINE SCREEN URINE: NORMAL
BARBITURATE SCREEN URINE: NORMAL
BENZODIAZEPINE SCREEN, URINE: NORMAL
BUPRENORPHINE URINE: NORMAL
COCAINE METABOLITE SCREEN URINE: NORMAL
FENTANYL SCREEN, URINE: NORMAL
GABAPENTIN SCREEN, URINE: NORMAL
MDMA, URINE: NORMAL
METHADONE SCREEN, URINE: NORMAL
METHAMPHETAMINE, URINE: NORMAL
OPIATE SCREEN URINE: NORMAL
OXYCODONE SCREEN URINE: NORMAL
PHENCYCLIDINE SCREEN URINE: NORMAL
PROPOXYPHENE SCREEN, URINE: NORMAL
SYNTHETIC CANNABINOIDS(K2) SCREEN, URINE: NORMAL
THC SCREEN, URINE: NORMAL
TRAMADOL SCREEN URINE: NORMAL
TRICYCLIC ANTIDEPRESSANTS, UR: NORMAL

## 2024-10-21 PROCEDURE — 99214 OFFICE O/P EST MOD 30 MIN: CPT | Performed by: PEDIATRICS

## 2024-10-21 PROCEDURE — 80305 DRUG TEST PRSMV DIR OPT OBS: CPT | Performed by: PEDIATRICS

## 2024-10-21 RX ORDER — ALPRAZOLAM 0.5 MG
0.25 TABLET ORAL DAILY PRN
Qty: 15 TABLET | Refills: 0 | Status: SHIPPED | OUTPATIENT
Start: 2024-10-21 | End: 2024-11-20

## 2024-10-21 RX ORDER — CITALOPRAM HYDROBROMIDE 10 MG/1
20 TABLET ORAL DAILY
Qty: 60 TABLET | Refills: 5 | Status: SHIPPED | OUTPATIENT
Start: 2024-10-21 | End: 2025-04-19

## 2024-10-21 RX ORDER — INSULIN ASPART 100 [IU]/ML
INJECTION, SOLUTION INTRAVENOUS; SUBCUTANEOUS
COMMUNITY
Start: 2024-09-28

## 2024-10-21 SDOH — ECONOMIC STABILITY: INCOME INSECURITY: HOW HARD IS IT FOR YOU TO PAY FOR THE VERY BASICS LIKE FOOD, HOUSING, MEDICAL CARE, AND HEATING?: NOT HARD AT ALL

## 2024-10-21 SDOH — ECONOMIC STABILITY: FOOD INSECURITY: WITHIN THE PAST 12 MONTHS, YOU WORRIED THAT YOUR FOOD WOULD RUN OUT BEFORE YOU GOT MONEY TO BUY MORE.: NEVER TRUE

## 2024-10-21 SDOH — ECONOMIC STABILITY: FOOD INSECURITY: WITHIN THE PAST 12 MONTHS, THE FOOD YOU BOUGHT JUST DIDN'T LAST AND YOU DIDN'T HAVE MONEY TO GET MORE.: NEVER TRUE

## 2024-10-21 ASSESSMENT — ENCOUNTER SYMPTOMS
SORE THROAT: 0
CHEST TIGHTNESS: 0
CONSTIPATION: 0
COLOR CHANGE: 0
EYE DISCHARGE: 0
STRIDOR: 0
VOMITING: 0
PHOTOPHOBIA: 0
COUGH: 0
WHEEZING: 0
RHINORRHEA: 0
DIARRHEA: 0
EYE PAIN: 0
EYE REDNESS: 0
SHORTNESS OF BREATH: 0

## 2024-10-21 NOTE — PROGRESS NOTES
veins of the right lower leg.  Pulmonary:      Effort: Pulmonary effort is normal. No respiratory distress.      Breath sounds: Normal breath sounds. No stridor. No wheezing, rhonchi or rales.   Abdominal:      General: Bowel sounds are normal.      Palpations: Abdomen is soft. There is no mass.      Tenderness: There is no abdominal tenderness. There is no right CVA tenderness, left CVA tenderness or guarding.   Musculoskeletal:      Cervical back: Normal range of motion and neck supple.      Right lower leg: No edema.      Left lower leg: No edema.   Lymphadenopathy:      Cervical: No cervical adenopathy.   Skin:     General: Skin is warm.      Capillary Refill: Capillary refill takes less than 2 seconds.      Findings: No rash.   Neurological:      General: No focal deficit present.      Mental Status: She is alert and oriented to person, place, and time.      Gait: Gait normal.   Psychiatric:         Mood and Affect: Mood is anxious. Mood is not depressed.         Speech: Speech normal.         Behavior: Behavior normal.         Thought Content: Thought content normal.         Judgment: Judgment normal.                  An electronic signature was used to authenticate this note.    --Gloria Darby MD

## 2024-10-21 NOTE — ASSESSMENT & PLAN NOTE
Orders:    citalopram (CELEXA) 10 MG tablet; Take 2 tablets by mouth daily    ALPRAZolam (XANAX) 0.5 MG tablet; Take 0.5 tablets by mouth daily as needed for Sleep or Anxiety for up to 30 days.

## 2024-10-22 DIAGNOSIS — F41.1 GENERALIZED ANXIETY DISORDER: Primary | ICD-10-CM

## 2024-10-22 NOTE — TELEPHONE ENCOUNTER
Pharmacy contacted  the office today because the Citalopram was called in at 10 mg to take two tablets and the insurance is asking for a script to cover 1 tablet at 20 mg instead. Or do you want to complete the PA

## 2024-10-23 ENCOUNTER — TELEPHONE (OUTPATIENT)
Dept: FAMILY MEDICINE CLINIC | Age: 44
End: 2024-10-23

## 2024-10-23 NOTE — TELEPHONE ENCOUNTER
I actually advised her to take 1 1/2 tablets initially to ensure she will tolerate this and she will increase to 2 tabs once daily thereafter.  Just easier to write for 2 tabs.      I did tell her this might happen.  Please try to complete PA or she could pay out of pocket with good RX.

## 2024-10-23 NOTE — TELEPHONE ENCOUNTER
Patients insurance will not cover her Celexa 10 mg twice daily but they will cover 20 mg 1 daily.    Please advise and route to clinical staff.

## 2024-10-25 RX ORDER — CITALOPRAM HYDROBROMIDE 20 MG/1
20 TABLET ORAL DAILY
Qty: 30 TABLET | Refills: 2 | Status: SHIPPED | OUTPATIENT
Start: 2024-10-25 | End: 2025-10-25

## 2024-10-25 NOTE — TELEPHONE ENCOUNTER
Pt states that she is unable to cut them in half- they just crumble so she started taking the 2 10mg tabs. She would like you to send the 20mg tabs for her and she will let us know if she does not tolerate them. She will be out of medication tonight, so please send for her. Writer pended Rx and verified pharmacy and dispensed amount with patient.

## 2025-04-09 DIAGNOSIS — F41.1 GENERALIZED ANXIETY DISORDER: ICD-10-CM

## 2025-04-10 RX ORDER — CITALOPRAM HYDROBROMIDE 20 MG/1
20 TABLET ORAL DAILY
Qty: 90 TABLET | Refills: 1 | Status: SHIPPED | OUTPATIENT
Start: 2025-04-10

## 2025-04-10 NOTE — TELEPHONE ENCOUNTER
LOV 10/21/24   RTO 4/23/25  LRF 10/25/24          Controlled Substance Monitoring:    Acute and Chronic Pain Monitoring:   RX Monitoring Periodic Controlled Substance Monitoring   10/21/2024   5:28 PM No signs of potential drug abuse or diversion identified.;Random urine drug screen sent today.

## 2025-04-23 ENCOUNTER — OFFICE VISIT (OUTPATIENT)
Dept: FAMILY MEDICINE CLINIC | Age: 45
End: 2025-04-23
Payer: COMMERCIAL

## 2025-04-23 VITALS
BODY MASS INDEX: 30.29 KG/M2 | DIASTOLIC BLOOD PRESSURE: 74 MMHG | HEIGHT: 64 IN | WEIGHT: 177.4 LBS | HEART RATE: 82 BPM | OXYGEN SATURATION: 98 % | SYSTOLIC BLOOD PRESSURE: 128 MMHG | TEMPERATURE: 98.5 F

## 2025-04-23 DIAGNOSIS — E10.9 TYPE 1 DIABETES MELLITUS WITHOUT COMPLICATION (HCC): ICD-10-CM

## 2025-04-23 DIAGNOSIS — Z12.11 SCREENING FOR COLORECTAL CANCER: ICD-10-CM

## 2025-04-23 DIAGNOSIS — N95.1 PERIMENOPAUSE: ICD-10-CM

## 2025-04-23 DIAGNOSIS — G89.29 CHRONIC BILATERAL LOW BACK PAIN WITHOUT SCIATICA: ICD-10-CM

## 2025-04-23 DIAGNOSIS — F41.1 GENERALIZED ANXIETY DISORDER: Primary | ICD-10-CM

## 2025-04-23 DIAGNOSIS — I83.93 SPIDER VEINS OF BOTH LOWER EXTREMITIES: ICD-10-CM

## 2025-04-23 DIAGNOSIS — M54.50 CHRONIC BILATERAL LOW BACK PAIN WITHOUT SCIATICA: ICD-10-CM

## 2025-04-23 DIAGNOSIS — M54.2 NECK PAIN: ICD-10-CM

## 2025-04-23 DIAGNOSIS — I83.813 VARICOSE VEINS OF BOTH LOWER EXTREMITIES WITH PAIN: ICD-10-CM

## 2025-04-23 DIAGNOSIS — I87.2 VENOUS INSUFFICIENCY OF BOTH LOWER EXTREMITIES: ICD-10-CM

## 2025-04-23 DIAGNOSIS — Z12.31 SCREENING MAMMOGRAM FOR BREAST CANCER: ICD-10-CM

## 2025-04-23 DIAGNOSIS — Z12.12 SCREENING FOR COLORECTAL CANCER: ICD-10-CM

## 2025-04-23 PROCEDURE — 99214 OFFICE O/P EST MOD 30 MIN: CPT | Performed by: PEDIATRICS

## 2025-04-23 RX ORDER — L. ACIDOPHILUS/PECTIN, CITRUS 25MM-100MG
TABLET ORAL
COMMUNITY

## 2025-04-23 SDOH — ECONOMIC STABILITY: FOOD INSECURITY: WITHIN THE PAST 12 MONTHS, THE FOOD YOU BOUGHT JUST DIDN'T LAST AND YOU DIDN'T HAVE MONEY TO GET MORE.: NEVER TRUE

## 2025-04-23 SDOH — ECONOMIC STABILITY: FOOD INSECURITY: WITHIN THE PAST 12 MONTHS, YOU WORRIED THAT YOUR FOOD WOULD RUN OUT BEFORE YOU GOT MONEY TO BUY MORE.: NEVER TRUE

## 2025-04-23 ASSESSMENT — ENCOUNTER SYMPTOMS
COLOR CHANGE: 0
SHORTNESS OF BREATH: 0
DIARRHEA: 0
PHOTOPHOBIA: 0
EYE REDNESS: 0
COUGH: 0
EYE PAIN: 0
CONSTIPATION: 0
SORE THROAT: 0
WHEEZING: 0
CHEST TIGHTNESS: 0
STRIDOR: 0
RHINORRHEA: 0
EYE DISCHARGE: 0
BACK PAIN: 1
VOMITING: 0

## 2025-04-23 ASSESSMENT — PATIENT HEALTH QUESTIONNAIRE - PHQ9
SUM OF ALL RESPONSES TO PHQ QUESTIONS 1-9: 0
2. FEELING DOWN, DEPRESSED OR HOPELESS: NOT AT ALL
SUM OF ALL RESPONSES TO PHQ QUESTIONS 1-9: 0

## 2025-04-23 NOTE — PROGRESS NOTES
Greer Ram (:  1980) is a 44 y.o. female,Established patient, here for evaluation of the following chief complaint(s):    Anxiety         Assessment & Plan  Generalized anxiety disorder            Perimenopause            Type 1 diabetes mellitus without complication (HCC)            Spider veins of both lower extremities            Varicose veins of both lower extremities with pain            Venous insufficiency of both lower extremities            Neck pain            Chronic bilateral low back pain without sciatica            Screening for colorectal cancer       Orders:    External Referral To General Surgery    Screening mammogram for breast cancer       Orders:    PANDA ANTONIO DIGITAL SCREEN BILATERAL; Future        Continue celexa 20mg once daily   Continue Xanax as needed for severe anxiety  POCT UDS and CSM are up to date  Anxiety reducing behaviors recommended  Daily exercise and healthy diet encouraged  Good sleep hygiene recommended  Follow-up with endocrinology as scheduled  Consider vascular follow-up if patient wishes  Follow up with chiropractor as scheduled   Colonoscopy recommended - General surgery referral to Dr. Chacon at [facility] was given   Obtain mammogram   Follow up with gynecology yearly   Multiple vaccines recommended including COVID-19 vaccine, pneumococcal vaccine, hepatitis B vaccine and Tdap vaccine - declined  Call with concerns        Return in about 6 months (around 10/23/2025) for routine follow up.       Subjective     HPI    Patient presents today for routine follow-up of generalized anxiety disorder.  She is currently treated with Celexa 20 mg once daily and she uses Xanax as needed for severe anxiety.  She states that her symptoms have been well-controlled with her current medications .She denies any significant depression, anxiety or panic attacks.  Once again she usually has anxiety at doctors visits for other anxiety provoking situations.  She denies any side effects

## 2025-08-11 LAB — HBA1C MFR BLD: 7 %
